# Patient Record
Sex: MALE | Race: BLACK OR AFRICAN AMERICAN | NOT HISPANIC OR LATINO | URBAN - METROPOLITAN AREA
[De-identification: names, ages, dates, MRNs, and addresses within clinical notes are randomized per-mention and may not be internally consistent; named-entity substitution may affect disease eponyms.]

---

## 2017-02-07 ENCOUNTER — EMERGENCY (EMERGENCY)
Facility: HOSPITAL | Age: 27
LOS: 1 days | Discharge: HOME | End: 2017-02-07
Attending: EMERGENCY MEDICINE
Payer: SELF-PAY

## 2017-02-07 VITALS
DIASTOLIC BLOOD PRESSURE: 62 MMHG | OXYGEN SATURATION: 96 % | RESPIRATION RATE: 22 BRPM | HEIGHT: 66 IN | SYSTOLIC BLOOD PRESSURE: 123 MMHG | HEART RATE: 100 BPM | TEMPERATURE: 98 F | WEIGHT: 132.06 LBS

## 2017-02-07 VITALS
SYSTOLIC BLOOD PRESSURE: 120 MMHG | HEART RATE: 89 BPM | DIASTOLIC BLOOD PRESSURE: 67 MMHG | RESPIRATION RATE: 18 BRPM | TEMPERATURE: 97 F | OXYGEN SATURATION: 99 %

## 2017-02-07 DIAGNOSIS — J45.909 UNSPECIFIED ASTHMA, UNCOMPLICATED: ICD-10-CM

## 2017-02-07 PROCEDURE — 99283 EMERGENCY DEPT VISIT LOW MDM: CPT | Mod: 25

## 2017-02-07 PROCEDURE — 94640 AIRWAY INHALATION TREATMENT: CPT

## 2017-02-07 PROCEDURE — 99284 EMERGENCY DEPT VISIT MOD MDM: CPT

## 2017-02-07 RX ORDER — IPRATROPIUM/ALBUTEROL SULFATE 18-103MCG
3 AEROSOL WITH ADAPTER (GRAM) INHALATION ONCE
Qty: 0 | Refills: 0 | Status: COMPLETED | OUTPATIENT
Start: 2017-02-07 | End: 2017-02-07

## 2017-02-07 RX ADMIN — Medication 3 MILLILITER(S): at 11:44

## 2017-02-07 RX ADMIN — Medication 40 MILLIGRAM(S): at 11:44

## 2017-02-07 NOTE — ED PROVIDER NOTE - OBJECTIVE STATEMENT
27 y/o M w/ PMHx of asthma p/w asthma exacerbation onset 2 days w/ SOB and wheezing. Pt has been using Albuterol to mild relief. Pt states he came today because he was visiting admitted grandmother in hospital. Pt was seen in ED 2 days ago for similar Sx and given Albuterol; pt was also seen in ED 2 weeks ago and given prednisone. Pt notes recent CXR last week w/ neg workup. Pt does not have PMD and states he missed appt with doctor recently but does not know why. Pt denies cough, fever, smoking, Hx of intubation, or any other complaint. NKDA. Pt does not have pulmonologist. 27 y/o M w/ PMHx of asthma p/w asthma exacerbation onset 2 days w/ SOB and wheezing. Pt has been using Albuterol to mild relief. Pt states he came today because he was visiting admitted grandmother in hospital. Pt is wearing multiple hospital bracelets from [facility] - pt was seen in ED 2 days ago for similar Sx and given Albuterol; pt was also seen in ED 2 weeks ago and given prednisone. Pt notes recent CXR last week w/ neg workup. Pt does not have PMD and states he missed appt with doctor recently but does not know why. Pt denies cough, fever, smoking, Hx of intubation, or any other complaint. NKDA. Pt does not have pulmonologist.

## 2017-02-07 NOTE — ED PROVIDER NOTE - MEDICAL DECISION MAKING DETAILS
Pt w/ asthma exacerbation. Speaking in full sentences. Prednisone and PCP f/u. Pt w/ asthma exacerbation. Speaking in full sentences. albuterol Prednisone and PCP f/u. patient declined primary care physician referral

## 2017-06-11 ENCOUNTER — EMERGENCY (EMERGENCY)
Facility: HOSPITAL | Age: 27
LOS: 1 days | Discharge: HOME | End: 2017-06-11
Attending: EMERGENCY MEDICINE | Admitting: EMERGENCY MEDICINE
Payer: SELF-PAY

## 2017-06-11 VITALS
HEART RATE: 68 BPM | RESPIRATION RATE: 20 BRPM | SYSTOLIC BLOOD PRESSURE: 103 MMHG | TEMPERATURE: 98 F | OXYGEN SATURATION: 98 % | DIASTOLIC BLOOD PRESSURE: 64 MMHG

## 2017-06-11 DIAGNOSIS — Z79.899 OTHER LONG TERM (CURRENT) DRUG THERAPY: ICD-10-CM

## 2017-06-11 DIAGNOSIS — J45.901 UNSPECIFIED ASTHMA WITH (ACUTE) EXACERBATION: ICD-10-CM

## 2017-06-11 PROCEDURE — 99053 MED SERV 10PM-8AM 24 HR FAC: CPT

## 2017-06-11 PROCEDURE — 99284 EMERGENCY DEPT VISIT MOD MDM: CPT | Mod: 25

## 2017-06-11 PROCEDURE — 99283 EMERGENCY DEPT VISIT LOW MDM: CPT | Mod: 25

## 2017-06-11 PROCEDURE — 94640 AIRWAY INHALATION TREATMENT: CPT

## 2017-06-11 RX ORDER — IPRATROPIUM/ALBUTEROL SULFATE 18-103MCG
3 AEROSOL WITH ADAPTER (GRAM) INHALATION ONCE
Qty: 0 | Refills: 0 | Status: COMPLETED | OUTPATIENT
Start: 2017-06-11 | End: 2017-06-11

## 2017-06-11 RX ORDER — ALBUTEROL 90 UG/1
1 AEROSOL, METERED ORAL ONCE
Qty: 0 | Refills: 0 | Status: COMPLETED | OUTPATIENT
Start: 2017-06-11 | End: 2017-06-11

## 2017-06-11 RX ADMIN — Medication 60 MILLIGRAM(S): at 06:13

## 2017-06-11 RX ADMIN — ALBUTEROL 1 PUFF(S): 90 AEROSOL, METERED ORAL at 06:42

## 2017-06-11 RX ADMIN — Medication 3 MILLILITER(S): at 06:04

## 2017-06-11 NOTE — ED PROVIDER NOTE - MEDICAL DECISION MAKING DETAILS
asthma exacerbation after running out of inhaler.  given neb/prednisone with relief.  home with similar.  new inhaler given

## 2017-06-11 NOTE — ED PROVIDER NOTE - OBJECTIVE STATEMENT
history of asthma, here with increased trouble breathing tonight.  States he ran of his inhaler.  In the ED 2 d ago for similar.  Given neb/steroid with improvement but didn't go home on prednisone.  Denies alcohol/drug use, fever, pain

## 2020-05-24 ENCOUNTER — EMERGENCY (EMERGENCY)
Facility: HOSPITAL | Age: 30
LOS: 1 days | Discharge: HOME | End: 2020-05-24
Attending: EMERGENCY MEDICINE
Payer: MEDICAID

## 2020-05-24 VITALS
TEMPERATURE: 98 F | RESPIRATION RATE: 18 BRPM | HEIGHT: 67 IN | HEART RATE: 68 BPM | WEIGHT: 130.07 LBS | OXYGEN SATURATION: 96 % | DIASTOLIC BLOOD PRESSURE: 68 MMHG | SYSTOLIC BLOOD PRESSURE: 104 MMHG

## 2020-05-24 PROCEDURE — 94640 AIRWAY INHALATION TREATMENT: CPT

## 2020-05-24 PROCEDURE — 99283 EMERGENCY DEPT VISIT LOW MDM: CPT

## 2020-05-24 RX ORDER — ALBUTEROL 90 UG/1
2 AEROSOL, METERED ORAL ONCE
Refills: 0 | Status: COMPLETED | OUTPATIENT
Start: 2020-05-24 | End: 2020-05-24

## 2020-05-24 RX ORDER — ALBUTEROL 90 UG/1
2 AEROSOL, METERED ORAL
Qty: 2 | Refills: 0
Start: 2020-05-24

## 2020-05-24 RX ADMIN — ALBUTEROL 2 PUFF(S): 90 AEROSOL, METERED ORAL at 15:41

## 2020-05-24 NOTE — ED ADULT NURSE NOTE - CHPI ED NUR SYMPTOMS NEG
no fever/no headache/no wheezing/no cough/no diaphoresis/no body aches/no chills/no edema/no hemoptysis/sats 96%/no chest pain

## 2020-05-24 NOTE — ED PROVIDER NOTE - CLINICAL SUMMARY MEDICAL DECISION MAKING FREE TEXT BOX
Patient with asthma exacerbation requesting albuterol prescription. Will give prescription and recommend PCP followup.

## 2020-05-24 NOTE — ED PROVIDER NOTE - NEUROLOGICAL, MLM
Alert and oriented, no focal deficits, no motor or sensory deficits.
Call bell/NPO/Explanation of exam/test

## 2020-05-24 NOTE — ED PROVIDER NOTE - OBJECTIVE STATEMENT
29 year old male with PMHx of asthma and no pertinent PSHx presents to the ED with complaints of an asthma exacerbation. Patient states that he ran out of his asthma pump, and is currently requesting a refill. Patient denies all other acute complaints. NKDA.

## 2020-05-24 NOTE — ED PROVIDER NOTE - PATIENT PORTAL LINK FT
You can access the FollowMyHealth Patient Portal offered by [facility] by registering at the following website: [email or web address]. By joining [employer]’s FollowMyHealth portal, you will also be able to view your health information using other applications (apps) compatible with our system.

## 2020-05-24 NOTE — ED ADULT TRIAGE NOTE - BSA (M2)
[Removes garments] : removes garments [Uses spoon/fork] : uses spoon/fork [Laughs with others] : laughs with others [Drinks from cup without spilling] : drinks from cup without spilling [Points to pictures] : points to pictures [Understands 2 step commands] : understands 2 step commands [Points to 1 body part] : points to 1 body part [Kicks ball forward] : kicks ball forward [Walks up steps] : walks up steps [Runs] : runs [FreeTextEntry3] : GM: 23 months\par PS: 19-3 months\par FM; 23 months\par language: 15 months  few words  Some  frusttraiton 1.68

## 2020-06-06 ENCOUNTER — EMERGENCY (EMERGENCY)
Facility: HOSPITAL | Age: 30
LOS: 1 days | Discharge: HOME | End: 2020-06-06
Attending: EMERGENCY MEDICINE
Payer: MEDICAID

## 2020-06-06 VITALS
OXYGEN SATURATION: 98 % | HEART RATE: 86 BPM | HEIGHT: 67 IN | WEIGHT: 130.07 LBS | RESPIRATION RATE: 20 BRPM | DIASTOLIC BLOOD PRESSURE: 80 MMHG | SYSTOLIC BLOOD PRESSURE: 129 MMHG | TEMPERATURE: 98 F

## 2020-06-06 PROCEDURE — 99283 EMERGENCY DEPT VISIT LOW MDM: CPT

## 2020-06-06 PROCEDURE — 94640 AIRWAY INHALATION TREATMENT: CPT

## 2020-06-06 RX ORDER — ALBUTEROL 90 UG/1
1 AEROSOL, METERED ORAL ONCE
Refills: 0 | Status: COMPLETED | OUTPATIENT
Start: 2020-06-06 | End: 2020-06-06

## 2020-06-06 RX ORDER — BUDESONIDE AND FORMOTEROL FUMARATE DIHYDRATE 160; 4.5 UG/1; UG/1
2 AEROSOL RESPIRATORY (INHALATION)
Refills: 0 | Status: DISCONTINUED | OUTPATIENT
Start: 2020-06-06 | End: 2020-06-10

## 2020-06-06 RX ADMIN — Medication 80 MILLIGRAM(S): at 15:57

## 2020-06-06 RX ADMIN — ALBUTEROL 1 PUFF(S): 90 AEROSOL, METERED ORAL at 15:57

## 2020-06-06 RX ADMIN — BUDESONIDE AND FORMOTEROL FUMARATE DIHYDRATE 2 PUFF(S): 160; 4.5 AEROSOL RESPIRATORY (INHALATION) at 15:57

## 2020-06-06 NOTE — ED PROVIDER NOTE - CLINICAL SUMMARY MEDICAL DECISION MAKING FREE TEXT BOX
29 year old male with history of asthma. Will give steroids and start on Budesonide. Called social work to give information to restart health insurance.

## 2020-06-06 NOTE — ED ADULT NURSE NOTE - NSIMPLEMENTINTERV_GEN_ALL_ED
"  Subjective:      Patient ID: Blake Dao is a 59 y.o. male.    Chief Complaint: Sinus Problem    Sinus Problem   This is a new problem. The current episode started 1 to 4 weeks ago. The problem has been gradually worsening since onset. Maximum temperature: subjective. Associated symptoms include chills, congestion, coughing, diaphoresis, headaches, shortness of breath, sinus pressure and a sore throat. Pertinent negatives include no ear pain, hoarse voice, neck pain, sneezing or swollen glands. Treatments tried: OTC sinus medications for 2 weeks. The treatment provided no relief.       Review of Systems   Constitutional: Positive for activity change, appetite change, chills, diaphoresis and fatigue. Negative for fever and unexpected weight change.   HENT: Positive for congestion, postnasal drip, rhinorrhea, sinus pressure and sore throat. Negative for dental problem, drooling, ear discharge, ear pain, facial swelling, hearing loss, hoarse voice, mouth sores, nosebleeds, sneezing and trouble swallowing.    Eyes: Negative for pain, discharge, redness, itching and visual disturbance.   Respiratory: Positive for cough and shortness of breath. Negative for chest tightness and wheezing.    Cardiovascular: Negative for chest pain, palpitations and leg swelling.   Gastrointestinal: Negative for abdominal pain, constipation, diarrhea, nausea and vomiting.   Endocrine: Negative.    Genitourinary: Negative.  Negative for difficulty urinating.   Musculoskeletal: Positive for myalgias. Negative for neck pain and neck stiffness.   Skin: Negative for rash.   Allergic/Immunologic: Negative for environmental allergies, food allergies and immunocompromised state.   Neurological: Positive for headaches. Negative for dizziness and weakness.     Objective:   BP (!) 120/98 (BP Location: Left arm, Patient Position: Sitting, BP Method: Medium (Manual))   Pulse 90   Temp 97.3 °F (36.3 °C) (Tympanic)   Resp 18   Ht 5' 11.85" " (1.825 m)   Wt 89.6 kg (197 lb 8.5 oz)   SpO2 100%   BMI 26.90 kg/m²     Physical Exam   Constitutional: He is oriented to person, place, and time. He appears well-developed and well-nourished. No distress.   HENT:   Head: Normocephalic and atraumatic.   Right Ear: Hearing, tympanic membrane, external ear and ear canal normal. No tenderness.   Left Ear: Hearing, tympanic membrane, external ear and ear canal normal. No tenderness.   Nose: Mucosal edema and rhinorrhea present. No sinus tenderness. Right sinus exhibits maxillary sinus tenderness and frontal sinus tenderness. Left sinus exhibits maxillary sinus tenderness and frontal sinus tenderness.   Mouth/Throat: Uvula is midline and mucous membranes are normal. No oral lesions. Normal dentition. No dental abscesses, uvula swelling or dental caries. Oropharyngeal exudate and posterior oropharyngeal erythema present. No posterior oropharyngeal edema or tonsillar abscesses. No tonsillar exudate.   Eyes: Pupils are equal, round, and reactive to light. Conjunctivae and EOM are normal. Right eye exhibits no discharge. Left eye exhibits no discharge.   Neck: Normal range of motion. Neck supple.   Cardiovascular: Normal rate, regular rhythm and normal heart sounds. Exam reveals no gallop and no friction rub.   No murmur heard.  Pulmonary/Chest: Effort normal. No respiratory distress. He has wheezes. He has rhonchi. He has no rales.   Lymphadenopathy:     He has no cervical adenopathy.   Neurological: He is alert and oriented to person, place, and time.   Skin: Skin is warm. No rash noted. He is not diaphoretic. No erythema. No pallor.   Psychiatric: He has a normal mood and affect. His behavior is normal. Judgment and thought content normal.   Nursing note and vitals reviewed.    X-Ray Chest PA And Lateral  Narrative: EXAMINATION:  XR CHEST PA AND LATERAL    CLINICAL HISTORY:  Shortness of breath    TECHNIQUE:  PA and lateral views of the chest were  performed.    COMPARISON:  05/18/2016    FINDINGS:  Cardiac silhouette and mediastinal contours are normal.  Lungs are clear.  Osseous structures are intact.  Impression: No acute cardiopulmonary process.    Electronically signed by: Joshua Caldwell MD  Date:    08/06/2019  Time:    11:47    Assessment:     1. Shortness of breath    2. Acute sinusitis, recurrence not specified, unspecified location    3. Recurrent sinus infections      Plan:   Shortness of breath  -     X-Ray Chest PA And Lateral; Future; Expected date: 08/06/2019    Acute sinusitis, recurrence not specified, unspecified location  -     X-Ray Chest PA And Lateral; Future; Expected date: 08/06/2019  -     amoxicillin-clavulanate 875-125mg (AUGMENTIN) 875-125 mg per tablet; Take 1 tablet by mouth 2 (two) times daily. for 10 days  Dispense: 20 tablet; Refill: 0    Recurrent sinus infections  -     Ambulatory Referral to ENT      Follow up if symptoms worsen or fail to improve.       Implemented All Universal Safety Interventions:  [place] to call system. Call bell, personal items and telephone within reach. Instruct patient to call for assistance. Room bathroom lighting operational. Non-slip footwear when patient is off stretcher. Physically safe environment: no spills, clutter or unnecessary equipment. Stretcher in lowest position, wheels locked, appropriate side rails in place.

## 2020-06-06 NOTE — ED PROVIDER NOTE - PATIENT PORTAL LINK FT
Addended by: NITESH TORREZ on: 7/5/2019 02:54 PM     Modules accepted: Orders     You can access the FollowMyHealth Patient Portal offered by [facility] by registering at the following website: [email or web address]. By joining [employer]’s FollowMyHealth portal, you will also be able to view your health information using other applications (apps) compatible with our system.

## 2020-06-06 NOTE — ED PROVIDER NOTE - OBJECTIVE STATEMENT
29 year old male with PMHx of asthma presenting with shortness of breath today. Patient was last seen here for the same on May 24th and has had multiple visits but never hospitalized and states that he does no have any insurance. Patient denies fever, chills, or any other complaints.

## 2020-07-01 ENCOUNTER — EMERGENCY (EMERGENCY)
Facility: HOSPITAL | Age: 30
LOS: 1 days | Discharge: HOME | End: 2020-07-01
Attending: EMERGENCY MEDICINE
Payer: MEDICAID

## 2020-07-01 VITALS
HEART RATE: 74 BPM | HEIGHT: 67 IN | WEIGHT: 130.07 LBS | SYSTOLIC BLOOD PRESSURE: 113 MMHG | TEMPERATURE: 99 F | RESPIRATION RATE: 16 BRPM | DIASTOLIC BLOOD PRESSURE: 64 MMHG | OXYGEN SATURATION: 98 %

## 2020-07-01 PROCEDURE — 99283 EMERGENCY DEPT VISIT LOW MDM: CPT | Mod: 25

## 2020-07-01 PROCEDURE — 99283 EMERGENCY DEPT VISIT LOW MDM: CPT

## 2020-07-01 PROCEDURE — 94640 AIRWAY INHALATION TREATMENT: CPT

## 2020-07-01 RX ORDER — ALBUTEROL 90 UG/1
2 AEROSOL, METERED ORAL ONCE
Refills: 0 | Status: COMPLETED | OUTPATIENT
Start: 2020-07-01 | End: 2020-07-01

## 2020-07-01 RX ADMIN — ALBUTEROL 2 PUFF(S): 90 AEROSOL, METERED ORAL at 19:02

## 2020-07-01 NOTE — ED ADULT TRIAGE NOTE - CHIEF COMPLAINT QUOTE
says he had an asthma attack today , no signs of distress noted in triage, (patient eating  snacks in triage)

## 2020-07-01 NOTE — ED PROVIDER NOTE - OBJECTIVE STATEMENT
28 y/o M pt with a PMHx of asthma, presents to the ED with complaints of "asthma attack". Patient reports he is having trouble breathing for the last 3 days. Patient appears comfortable at rest. Patient also noted to be in a hospital gown with discharge instructions from another hospital. When questioned, patient states he just dame from another hospital but refused to show discharge instructions or provide any information about other hospital visit. When provider asked if he is looking for an Albuterol inhaler, patient states, "yes". Denies chest pain, fever, chills, abdominal pain or any other symptoms. NKDA.

## 2020-07-01 NOTE — ED ADULT NURSE NOTE - NSIMPLEMENTINTERV_GEN_ALL_ED
Implemented All Universal Safety Interventions:  [place] to call system. Call bell, personal items and telephone within reach. Instruct patient to call for assistance. Room bathroom lighting operational. Non-slip footwear when patient is off stretcher. Physically safe environment: no spills, clutter or unnecessary equipment. Stretcher in lowest position, wheels locked, appropriate side rails in place.

## 2020-07-01 NOTE — ED PROVIDER NOTE - PROGRESS NOTE DETAILS
Patient left before receiving discharge paperwork.  Patient comfortable appearing throughout stay in ED

## 2020-07-01 NOTE — ED PROVIDER NOTE - CONSTITUTIONAL, MLM
normal... Well appearing, awake, alert, oriented to person, place, time/situation and in no apparent distress. Able to speak in full sentences.

## 2020-07-01 NOTE — ED PROVIDER NOTE - PATIENT PORTAL LINK FT
You can access the FollowMyHealth Patient Portal offered by [facility] by registering at the following website: [email or web address]. By joining [employer]’s FollowMyHealth portal, you will also be able to view your health information using other applications (apps) compatible with our system.

## 2020-07-01 NOTE — ED PROVIDER NOTE - ATTENDING CONTRIBUTION TO CARE
30 yo M with reported asthma attack. Patient unkempt and anxious. Recently discharged from [place]. Asking for albuterol inhaler. Pt is nontoxic appearing, not hypoxic, no respiratory distress. Patient left without discharge paperwork. Nontoxic and medically stable for discharge.     Dr. MATHEW Portillo:  I have independently evaluated the patient and have documented in the appropriate sections above.  I agree with the exam and plan as noted above.

## 2020-07-19 ENCOUNTER — EMERGENCY (EMERGENCY)
Facility: HOSPITAL | Age: 30
LOS: 1 days | Discharge: HOME | End: 2020-07-19
Attending: EMERGENCY MEDICINE
Payer: MEDICAID

## 2020-07-19 VITALS
HEART RATE: 64 BPM | WEIGHT: 136.03 LBS | HEIGHT: 69 IN | TEMPERATURE: 98 F | SYSTOLIC BLOOD PRESSURE: 129 MMHG | DIASTOLIC BLOOD PRESSURE: 83 MMHG | OXYGEN SATURATION: 99 % | RESPIRATION RATE: 16 BRPM

## 2020-07-19 PROCEDURE — 99283 EMERGENCY DEPT VISIT LOW MDM: CPT

## 2020-07-19 PROCEDURE — 99283 EMERGENCY DEPT VISIT LOW MDM: CPT | Mod: 25

## 2020-07-19 PROCEDURE — 94640 AIRWAY INHALATION TREATMENT: CPT

## 2020-07-19 RX ORDER — ALBUTEROL 90 UG/1
2 AEROSOL, METERED ORAL
Qty: 1 | Refills: 0
Start: 2020-07-19 | End: 2020-08-17

## 2020-07-19 RX ORDER — ALBUTEROL 90 UG/1
1 AEROSOL, METERED ORAL ONCE
Refills: 0 | Status: COMPLETED | OUTPATIENT
Start: 2020-07-19 | End: 2020-07-19

## 2020-07-19 RX ADMIN — ALBUTEROL 1 PUFF(S): 90 AEROSOL, METERED ORAL at 21:41

## 2020-07-19 NOTE — ED PROVIDER NOTE - OBJECTIVE STATEMENT
29 year old male with PMHx of asthma and no significant PSHx presents to the ED for medication refill. Patient states that he ran out of his albuterol inhaler. Patient denies any active symptoms at this time including chest pain, shortness of breath, cough, and fevers. NKDA.

## 2020-07-19 NOTE — ED PROVIDER NOTE - PRINCIPAL DIAGNOSIS
no discharge, no irritation, no pain, no redness, and no visual changes. Encounter for medication refill

## 2020-07-19 NOTE — ED PROVIDER NOTE - PATIENT PORTAL LINK FT
You can access the FollowMyHealth Patient Portal offered by [facility] by registering at the following website: [email or web address]. By joining [employer]’s FollowMyHealth portal, you will also be able to view your health information using other applications (apps) compatible with our system.

## 2020-07-19 NOTE — ED ADULT NURSE NOTE - NSIMPLEMENTINTERV_GEN_ALL_ED
Implemented All Universal Safety Interventions:  [place] to call system. Call bell, personal items and telephone within reach. Instruct patient to call for assistance. Room bathroom lighting operational. Non-slip footwear when patient is off stretcher. Physically safe environment: no spills, clutter or unnecessary equipment. Stretcher in lowest position, wheels locked, appropriate side rails in place.

## 2020-07-19 NOTE — ED ADULT NURSE NOTE - OBJECTIVE STATEMENT
pt is here for medication refill.  pt stated that ran out of medication, needs albuterol inhaler, denied sob or cough, denied N/V/D, no distress noted at this time.

## 2020-07-31 ENCOUNTER — EMERGENCY (EMERGENCY)
Facility: HOSPITAL | Age: 30
LOS: 1 days | Discharge: HOME | End: 2020-07-31
Attending: EMERGENCY MEDICINE
Payer: MEDICAID

## 2020-07-31 VITALS
OXYGEN SATURATION: 99 % | DIASTOLIC BLOOD PRESSURE: 72 MMHG | HEIGHT: 69 IN | RESPIRATION RATE: 16 BRPM | SYSTOLIC BLOOD PRESSURE: 108 MMHG | TEMPERATURE: 98 F | HEART RATE: 61 BPM | WEIGHT: 130.07 LBS

## 2020-07-31 PROCEDURE — 94640 AIRWAY INHALATION TREATMENT: CPT

## 2020-07-31 PROCEDURE — 99283 EMERGENCY DEPT VISIT LOW MDM: CPT | Mod: 25

## 2020-07-31 PROCEDURE — 99284 EMERGENCY DEPT VISIT MOD MDM: CPT

## 2020-07-31 RX ORDER — DEXAMETHASONE 0.5 MG/5ML
8 ELIXIR ORAL ONCE
Refills: 0 | Status: COMPLETED | OUTPATIENT
Start: 2020-07-31 | End: 2020-07-31

## 2020-07-31 RX ORDER — ALBUTEROL 90 UG/1
1 AEROSOL, METERED ORAL ONCE
Refills: 0 | Status: COMPLETED | OUTPATIENT
Start: 2020-07-31 | End: 2020-07-31

## 2020-07-31 RX ORDER — ALBUTEROL 90 UG/1
2.5 AEROSOL, METERED ORAL ONCE
Refills: 0 | Status: COMPLETED | OUTPATIENT
Start: 2020-07-31 | End: 2020-07-31

## 2020-07-31 RX ADMIN — Medication 8 MILLIGRAM(S): at 22:47

## 2020-07-31 RX ADMIN — ALBUTEROL 2.5 MILLIGRAM(S): 90 AEROSOL, METERED ORAL at 22:46

## 2020-07-31 RX ADMIN — ALBUTEROL 1 PUFF(S): 90 AEROSOL, METERED ORAL at 22:47

## 2020-07-31 NOTE — ED PROVIDER NOTE - OBJECTIVE STATEMENT
29 year old male PMH asthma coming in with asthma exacerbation. states happened today and he ran out of his inhaler so came to the ed. states feels somewhat improved at this time. denies all other complaints.

## 2020-07-31 NOTE — ED PROVIDER NOTE - PATIENT PORTAL LINK FT
You can access the FollowMyHealth Patient Portal offered by [facility] by registering at the following website: [email or web address]. By joining [employer]’s FollowMyHealth portal, you will also be able to view your health information using other applications (apps) compatible with our system.

## 2020-07-31 NOTE — ED ADULT NURSE NOTE - NSIMPLEMENTINTERV_GEN_ALL_ED
Implemented All Fall Risk Interventions:  [place] to call system. Call bell, personal items and telephone within reach. Instruct patient to call for assistance. Room bathroom lighting operational. Non-slip footwear when patient is off stretcher. Physically safe environment: no spills, clutter or unnecessary equipment. Stretcher in lowest position, wheels locked, appropriate side rails in place. Provide visual cue, wrist band, yellow gown, etc. Monitor gait and stability. Monitor for mental status changes and reorient to person, place, and time. Review medications for side effects contributing to fall risk. Reinforce activity limits and safety measures with patient and family.

## 2020-07-31 NOTE — ED PROVIDER NOTE - CLINICAL SUMMARY MEDICAL DECISION MAKING FREE TEXT BOX
29 year old male with asthma exacerbation. vitals WNL. PE as above.  no wheezing. asking for a treatment and an inhaler. will dc. f/u PMD. return precautions discussed.

## 2020-07-31 NOTE — ED PROVIDER NOTE - NS ED ATTENDING STATEMENT MOD
Attending Only O-L Flap Text: The defect edges were debeveled with a #15 scalpel blade.  Given the location of the defect, shape of the defect and the proximity to free margins an O-L flap was deemed most appropriate.  Using a sterile surgical marker, an appropriate advancement flap was drawn incorporating the defect and placing the expected incisions within the relaxed skin tension lines where possible.    The area thus outlined was incised deep to adipose tissue with a #15 scalpel blade.  The skin margins were undermined to an appropriate distance in all directions utilizing iris scissors.

## 2020-09-07 ENCOUNTER — EMERGENCY (EMERGENCY)
Facility: HOSPITAL | Age: 30
LOS: 1 days | Discharge: LEFT BEFORE DISCHARGE | End: 2020-09-07
Attending: EMERGENCY MEDICINE
Payer: MEDICAID

## 2020-09-07 VITALS
DIASTOLIC BLOOD PRESSURE: 73 MMHG | WEIGHT: 130.07 LBS | SYSTOLIC BLOOD PRESSURE: 113 MMHG | RESPIRATION RATE: 18 BRPM | OXYGEN SATURATION: 98 % | HEART RATE: 77 BPM | HEIGHT: 67 IN | TEMPERATURE: 98 F

## 2020-09-07 PROCEDURE — 94640 AIRWAY INHALATION TREATMENT: CPT

## 2020-09-07 PROCEDURE — 99283 EMERGENCY DEPT VISIT LOW MDM: CPT

## 2020-09-07 PROCEDURE — 99283 EMERGENCY DEPT VISIT LOW MDM: CPT | Mod: 25

## 2020-09-07 RX ORDER — IPRATROPIUM/ALBUTEROL SULFATE 18-103MCG
3 AEROSOL WITH ADAPTER (GRAM) INHALATION ONCE
Refills: 0 | Status: COMPLETED | OUTPATIENT
Start: 2020-09-07 | End: 2020-09-07

## 2020-09-07 RX ADMIN — Medication 3 MILLILITER(S): at 04:20

## 2020-09-07 RX ADMIN — Medication 50 MILLIGRAM(S): at 04:20

## 2020-09-07 NOTE — ED PROVIDER NOTE - CLINICAL SUMMARY MEDICAL DECISION MAKING FREE TEXT BOX
7am- Pt not in ED, eloped prior to reevaluation. 7am- Pt not in ED, eloped prior to reevaluation. Voice message left on provided telephone number to return.

## 2020-09-07 NOTE — ED PROVIDER NOTE - OBJECTIVE STATEMENT
Chief complaint of wheezing x 2 days. No fever, no productive coughing. No sick contacts, no outside [facility] travels.   No hx of intubations.

## 2020-09-21 ENCOUNTER — EMERGENCY (EMERGENCY)
Facility: HOSPITAL | Age: 30
LOS: 1 days | Discharge: HOME | End: 2020-09-21
Attending: EMERGENCY MEDICINE
Payer: MEDICAID

## 2020-09-21 VITALS
SYSTOLIC BLOOD PRESSURE: 115 MMHG | HEIGHT: 67 IN | HEART RATE: 77 BPM | OXYGEN SATURATION: 99 % | WEIGHT: 134.92 LBS | DIASTOLIC BLOOD PRESSURE: 78 MMHG | RESPIRATION RATE: 16 BRPM | TEMPERATURE: 98 F

## 2020-09-21 PROCEDURE — 94640 AIRWAY INHALATION TREATMENT: CPT

## 2020-09-21 PROCEDURE — 99283 EMERGENCY DEPT VISIT LOW MDM: CPT

## 2020-09-21 PROCEDURE — 99283 EMERGENCY DEPT VISIT LOW MDM: CPT | Mod: 25

## 2020-09-21 RX ORDER — ALBUTEROL 90 UG/1
2 AEROSOL, METERED ORAL ONCE
Refills: 0 | Status: COMPLETED | OUTPATIENT
Start: 2020-09-21 | End: 2020-09-21

## 2020-09-21 RX ORDER — ALBUTEROL 90 UG/1
2 AEROSOL, METERED ORAL
Qty: 1 | Refills: 0
Start: 2020-09-21 | End: 2020-10-20

## 2020-09-21 RX ORDER — ALBUTEROL 90 UG/1
2 AEROSOL, METERED ORAL
Qty: 1 | Refills: 0
Start: 2020-09-21 | End: 2020-11-05

## 2020-09-21 RX ADMIN — ALBUTEROL 2 PUFF(S): 90 AEROSOL, METERED ORAL at 14:56

## 2020-09-21 NOTE — ED PROVIDER NOTE - OBJECTIVE STATEMENT
29 y/o male with PMHx of asthma presents to the ED c/o wheezing x 2 weeks. Pt requesting an Rx of emergency albuterol pump. Pt denies fever, cough, or any other complaints. NKDA.

## 2020-09-21 NOTE — ED ADULT TRIAGE NOTE - RESPIRATORY RATE (BREATHS/MIN)
1455 Pt arrived at [facility] and in no distress for Daily Daptomycin . Assessment unchanged, no new complaints voiced. PICC dsg CDI, flushed per policy and positive for blood return. Visit Vitals    /50 (BP 1 Location: Left arm, BP Patient Position: At rest)    Pulse 61    Temp 97.9 °F (36.6 °C)    Resp 18    SpO2 97%       Medications received:  NS Flush  Daptomycin 500 mg IVP  Heparin Flush    1510 Tolerated treatment well, no adverse reaction noted. PICC flushed per policy and capped. D/Cd from [facility] and in no distress accompanied by Spouse.   Next appt 7/29/17 @ [facility]
16

## 2020-09-21 NOTE — ED PROVIDER NOTE - PATIENT PORTAL LINK FT
You can access the FollowMyHealth Patient Portal offered by [facility] by registering at the following website: [email or web address]. By joining [employer]’s FollowMyHealth portal, you will also be able to view your health information using other applications (apps) compatible with our system.

## 2020-09-21 NOTE — ED ADULT NURSE NOTE - OBJECTIVE STATEMENT
States he has wheezing due to asthma x2 weeks .Denies cough ,fever or shortness of breath .Request RX for Albuterol pump.No SOB noted in ED .

## 2020-09-21 NOTE — ED PROVIDER NOTE - CLINICAL SUMMARY MEDICAL DECISION MAKING FREE TEXT BOX
30 M with Hx asthma in ED with wheezing. Will provide an albuterol inhaler in ED and refill Rx. Will d/c home.

## 2020-10-01 ENCOUNTER — EMERGENCY (EMERGENCY)
Facility: HOSPITAL | Age: 30
LOS: 1 days | Discharge: HOME | End: 2020-10-01
Attending: EMERGENCY MEDICINE
Payer: MEDICAID

## 2020-10-01 VITALS
TEMPERATURE: 98 F | HEART RATE: 78 BPM | HEIGHT: 67 IN | RESPIRATION RATE: 20 BRPM | OXYGEN SATURATION: 97 % | SYSTOLIC BLOOD PRESSURE: 129 MMHG | DIASTOLIC BLOOD PRESSURE: 78 MMHG | WEIGHT: 121.25 LBS

## 2020-10-01 PROCEDURE — 99284 EMERGENCY DEPT VISIT MOD MDM: CPT

## 2020-10-01 PROCEDURE — 94640 AIRWAY INHALATION TREATMENT: CPT

## 2020-10-01 PROCEDURE — 99284 EMERGENCY DEPT VISIT MOD MDM: CPT | Mod: 25

## 2020-10-01 RX ORDER — ALBUTEROL 90 UG/1
1 AEROSOL, METERED ORAL ONCE
Refills: 0 | Status: COMPLETED | OUTPATIENT
Start: 2020-10-01 | End: 2020-10-01

## 2020-10-01 RX ORDER — IPRATROPIUM/ALBUTEROL SULFATE 18-103MCG
3 AEROSOL WITH ADAPTER (GRAM) INHALATION ONCE
Refills: 0 | Status: COMPLETED | OUTPATIENT
Start: 2020-10-01 | End: 2020-10-01

## 2020-10-01 RX ADMIN — Medication 50 MILLIGRAM(S): at 11:40

## 2020-10-01 RX ADMIN — ALBUTEROL 1 PUFF(S): 90 AEROSOL, METERED ORAL at 12:11

## 2020-10-01 RX ADMIN — Medication 3 MILLILITER(S): at 11:40

## 2020-10-01 NOTE — ED PROVIDER NOTE - PROGRESS NOTE DETAILS
Tolerated treatment well, will advise FU with PMD. Pt is well appearing walking with steady gait, stable for discharge and follow up without fail with medical doctor. I had a detailed discussion with the patient and/or guardian regarding the historical points, exam findings, and any diagnostic results supporting the discharge diagnosis. Pt educated on care and need for follow up. Strict return instructions and red flag signs and symptoms discussed with patient. Questions answered. Pt shows understanding of discharge information and agrees to follow.

## 2020-10-01 NOTE — ED PROVIDER NOTE - OBJECTIVE STATEMENT
29 yo male with PMHx of asthma presenting to ED with complaints of difficulty breathing and asthma attack today. Patient endorses taking his Albuterol pump without relief. Denies fever, sick contacts or travel. Denies CP or dizziness.

## 2020-10-01 NOTE — ED ADULT TRIAGE NOTE - CHIEF COMPLAINT QUOTE
P/s Asthma attack x today. No relief with Alb pump P/s Asthma attack x today. No relief with Alb pump. Peak flow 440

## 2020-10-01 NOTE — ED PROVIDER NOTE - PATIENT PORTAL LINK FT
You can access the FollowMyHealth Patient Portal offered by [facility] by registering at the following website: [email or web address]. By joining [employer]’s FollowMyHealth portal, you will also be able to view your health information using other applications (apps) compatible with our system.

## 2020-10-01 NOTE — ED PROVIDER NOTE - PHYSICAL EXAMINATION
Lungs CTA with equal bilateral chest rise, no increased work of breathing, speaking in full sentences.

## 2020-10-01 NOTE — ED PROVIDER NOTE - CLINICAL SUMMARY MEDICAL DECISION MAKING FREE TEXT BOX
Based on exam and history likely exacerbation of asthma due to cold weather and patient being undomiciled, will give Duo neb, Prednisone PO and recommend FU with PMD.

## 2020-10-07 ENCOUNTER — EMERGENCY (EMERGENCY)
Facility: HOSPITAL | Age: 30
LOS: 1 days | Discharge: HOME | End: 2020-10-07
Attending: STUDENT IN AN ORGANIZED HEALTH CARE EDUCATION/TRAINING PROGRAM
Payer: MEDICAID

## 2020-10-07 VITALS
RESPIRATION RATE: 18 BRPM | OXYGEN SATURATION: 99 % | DIASTOLIC BLOOD PRESSURE: 74 MMHG | HEART RATE: 78 BPM | WEIGHT: 123.46 LBS | SYSTOLIC BLOOD PRESSURE: 109 MMHG | HEIGHT: 67 IN

## 2020-10-07 VITALS — TEMPERATURE: 99 F

## 2020-10-07 PROCEDURE — 99283 EMERGENCY DEPT VISIT LOW MDM: CPT

## 2020-10-07 PROCEDURE — 99283 EMERGENCY DEPT VISIT LOW MDM: CPT | Mod: 25

## 2020-10-07 PROCEDURE — 94640 AIRWAY INHALATION TREATMENT: CPT

## 2020-10-07 RX ORDER — ALBUTEROL 90 UG/1
1 AEROSOL, METERED ORAL EVERY 4 HOURS
Refills: 0 | Status: DISCONTINUED | OUTPATIENT
Start: 2020-10-07 | End: 2020-10-10

## 2020-10-07 RX ORDER — DEXAMETHASONE 0.5 MG/5ML
10 ELIXIR ORAL ONCE
Refills: 0 | Status: COMPLETED | OUTPATIENT
Start: 2020-10-07 | End: 2020-10-07

## 2020-10-07 RX ORDER — ALBUTEROL 90 UG/1
1 AEROSOL, METERED ORAL EVERY 4 HOURS
Refills: 0 | Status: COMPLETED | OUTPATIENT
Start: 2020-10-07 | End: 2021-09-05

## 2020-10-07 RX ADMIN — ALBUTEROL 1 PUFF(S): 90 AEROSOL, METERED ORAL at 09:56

## 2020-10-07 RX ADMIN — Medication 10 MILLIGRAM(S): at 09:56

## 2020-10-07 NOTE — ED ADULT NURSE NOTE - NSIMPLEMENTINTERV_GEN_ALL_ED
Implemented All Universal Safety Interventions:  [place] to call system. Call bell, personal items and telephone within reach. Instruct patient to call for assistance. Room bathroom lighting operational. Non-slip footwear when patient is off stretcher. Physically safe environment: no spills, clutter or unnecessary equipment. Stretcher in lowest position, wheels locked, appropriate side rails in place.

## 2020-10-07 NOTE — ED PROVIDER NOTE - CLINICAL SUMMARY MEDICAL DECISION MAKING FREE TEXT BOX
Patient presenting with sob. pft 450. speaking full sentences. lung clear. no cough or cp. vital stable. will give steroid, albuterol, return precautions, patient clinically stable. no airway compromise on exam

## 2020-10-07 NOTE — ED PROVIDER NOTE - CARE PLAN
Principal Discharge DX:	Mild asthma, unspecified whether complicated, unspecified whether persistent

## 2020-10-07 NOTE — ED PROVIDER NOTE - PATIENT PORTAL LINK FT
You can access the FollowMyHealth Patient Portal offered by [facility] by registering at the following website: [email or web address]. By joining [employer]’s FollowMyHealth portal, you will also be able to view your health information using other applications (apps) compatible with our system.

## 2020-10-07 NOTE — ED PROVIDER NOTE - OBJECTIVE STATEMENT
30 y.o w/ pmh of asthma presenting with sob x 2 days. endorses feel like prior asthma. denies n, v, cp, fever, abd pain. endorses running out of albuterol.

## 2020-10-07 NOTE — ED ADULT NURSE NOTE - OBJECTIVE STATEMENT
pt presents for c/o sob & wheezing x 2days, has pmh of asthma & has had similar episodes in the past. Endorses running out of his current albuterol medication. Denies cp, palpitations, HA, dizziness, cough, fever or chills. NAD. Breathing unlabored on RA.

## 2020-10-30 ENCOUNTER — EMERGENCY (EMERGENCY)
Facility: HOSPITAL | Age: 30
LOS: 1 days | Discharge: HOME | End: 2020-10-30
Attending: EMERGENCY MEDICINE
Payer: MEDICAID

## 2020-10-30 VITALS
HEIGHT: 67 IN | OXYGEN SATURATION: 100 % | WEIGHT: 130.07 LBS | HEART RATE: 66 BPM | TEMPERATURE: 98 F | SYSTOLIC BLOOD PRESSURE: 109 MMHG | DIASTOLIC BLOOD PRESSURE: 68 MMHG | RESPIRATION RATE: 16 BRPM

## 2020-10-30 PROCEDURE — 94640 AIRWAY INHALATION TREATMENT: CPT

## 2020-10-30 PROCEDURE — 99284 EMERGENCY DEPT VISIT MOD MDM: CPT | Mod: 25

## 2020-10-30 PROCEDURE — 99284 EMERGENCY DEPT VISIT MOD MDM: CPT

## 2020-10-30 RX ORDER — IPRATROPIUM/ALBUTEROL SULFATE 18-103MCG
3 AEROSOL WITH ADAPTER (GRAM) INHALATION ONCE
Refills: 0 | Status: COMPLETED | OUTPATIENT
Start: 2020-10-30 | End: 2020-10-30

## 2020-10-30 RX ORDER — ALBUTEROL 90 UG/1
1 AEROSOL, METERED ORAL ONCE
Refills: 0 | Status: COMPLETED | OUTPATIENT
Start: 2020-10-30 | End: 2020-10-30

## 2020-10-30 RX ADMIN — Medication 3 MILLILITER(S): at 14:57

## 2020-10-30 RX ADMIN — ALBUTEROL 1 PUFF(S): 90 AEROSOL, METERED ORAL at 15:15

## 2020-10-30 NOTE — ED PROVIDER NOTE - OBJECTIVE STATEMENT
29 yo male with PMHx of asthma, presenting to ED with complaints of asthma exacerbation today. Patient has also run our of his MDI. Patient thinks cold air today made him SOB. Denies recent illness, fever, sick contacts or recent travel.

## 2020-10-30 NOTE — ED PROVIDER NOTE - ATTENDING CONTRIBUTION TO CARE
I completed an independent physical examination.   I have signed out the follow up of any pending tests (i.e. labs, radiological studies) to the PA/NP.  I have discussed the patient’s plan of care and disposition with the PA/NP    Patient with mild asthma flare up. Will give nebs and MDI and discharge when clinically improved.

## 2020-10-30 NOTE — ED ADULT NURSE REASSESSMENT NOTE - NS ED NURSE REASSESS COMMENT FT1
pt alert awake comfortable appearance no acute respiratory distress noted peak flow 500 prior to nebulizer treatment

## 2020-10-30 NOTE — ED ADULT NURSE NOTE - OBJECTIVE STATEMENT
pt from home c/o of SOB and cough today, reports ran out of albuterol inhaler, states "cold air made me short of breath", peak flow 500 prior to nebulizer treatment

## 2020-10-30 NOTE — ED PROVIDER NOTE - PATIENT PORTAL LINK FT
You can access the FollowMyHealth Patient Portal offered by [facility] by registering at the following website: [email or web address]. By joining [employer]’s FollowMyHealth portal, you will also be able to view your health information using other applications (apps) compatible with our system.

## 2020-10-30 NOTE — ED PROVIDER NOTE - PROGRESS NOTE DETAILS
Expressing relief with treatment. Pt is well appearing walking with steady gait, stable for discharge and follow up without fail with medical doctor. I had a detailed discussion with the patient and/or guardian regarding the historical points, exam findings, and any diagnostic results supporting the discharge diagnosis. Pt educated on care and need for follow up. Strict return instructions and red flag signs and symptoms discussed with patient. Questions answered. Pt shows understanding of discharge information and agrees to follow.

## 2020-10-30 NOTE — ED PROVIDER NOTE - CLINICAL SUMMARY MEDICAL DECISION MAKING FREE TEXT BOX
Based on exam and history likely asthma exacerbation, less likely URI/PNA. Will give duo neb treatment, and MDI to take home.

## 2020-11-18 ENCOUNTER — EMERGENCY (EMERGENCY)
Facility: HOSPITAL | Age: 30
LOS: 1 days | Discharge: HOME | End: 2020-11-18
Attending: EMERGENCY MEDICINE
Payer: MEDICAID

## 2020-11-18 VITALS
TEMPERATURE: 98 F | DIASTOLIC BLOOD PRESSURE: 94 MMHG | RESPIRATION RATE: 20 BRPM | HEIGHT: 67 IN | OXYGEN SATURATION: 99 % | SYSTOLIC BLOOD PRESSURE: 153 MMHG | HEART RATE: 94 BPM | WEIGHT: 130.07 LBS

## 2020-11-18 PROCEDURE — 99283 EMERGENCY DEPT VISIT LOW MDM: CPT

## 2020-11-18 PROCEDURE — 99282 EMERGENCY DEPT VISIT SF MDM: CPT

## 2020-11-18 NOTE — ED PROVIDER NOTE - PHYSICAL EXAMINATION
pt refuses to be examined, pt got up & left.  Noted tobacco on the stretcher & on floor.  Pt in no resp. distress. ROM intact/normal shape

## 2020-11-18 NOTE — ED ADULT NURSE NOTE - OBJECTIVE STATEMENT
pt is here for asthma.  pt stated that here for "asthma", finished sentences without difficulty, denied chest pain or sob, denied N/V/D, no distress noted at this time.

## 2020-11-18 NOTE — ED PROVIDER NOTE - PATIENT PORTAL LINK FT
You can access the FollowMyHealth Patient Portal offered by [facility] by registering at the following website: [email or web address]. By joining [employer]’s FollowMyHealth portal, you will also be able to view your health information using other applications (apps) compatible with our system.

## 2020-11-18 NOTE — ED PROVIDER NOTE - CLINICAL SUMMARY MEDICAL DECISION MAKING FREE TEXT BOX
pt claims he has asthma attack, but does not appear in resp distress.  Noted lots of tobacco on the stretcher & floor.  Pt refuses to be examined & walked out.

## 2020-11-18 NOTE — ED PROVIDER NOTE - OBJECTIVE STATEMENT
30 y.o. male c/o sob today, dry coughing, wheezing, no fever, n/v, pt ran out of his inhaler, pt's holding on 2 inhalers & asking for treatment.  Pt claims he lives with his mother in an apt.  No COVID exposure. 30 y.o. male c/o "I have asthma attack". sob today, dry coughing, wheezing, no fever, n/v, pt ran out of his inhaler, pt's holding on 2 inhalers & asking for treatment.  Pt claims he lives with his mother in an apt.  No COVID exposure.

## 2020-11-18 NOTE — ED ADULT NURSE NOTE - NSIMPLEMENTINTERV_GEN_ALL_ED
Implemented All Universal Safety Interventions:  [place] to call system. Call bell, personal items and telephone within reach. Instruct patient to call for assistance. Room bathroom lighting operational. Non-slip footwear when patient is off stretcher. Physically safe environment: no spills, clutter or unnecessary equipment. Stretcher in lowest position, wheels locked, appropriate side rails in place.

## 2020-12-16 ENCOUNTER — EMERGENCY (EMERGENCY)
Facility: HOSPITAL | Age: 30
LOS: 1 days | Discharge: HOME | End: 2020-12-16
Attending: EMERGENCY MEDICINE
Payer: MEDICAID

## 2020-12-16 VITALS
DIASTOLIC BLOOD PRESSURE: 68 MMHG | RESPIRATION RATE: 18 BRPM | TEMPERATURE: 98 F | SYSTOLIC BLOOD PRESSURE: 120 MMHG | HEART RATE: 60 BPM | OXYGEN SATURATION: 98 %

## 2020-12-16 VITALS
RESPIRATION RATE: 18 BRPM | HEART RATE: 67 BPM | TEMPERATURE: 98 F | WEIGHT: 130.07 LBS | SYSTOLIC BLOOD PRESSURE: 122 MMHG | OXYGEN SATURATION: 99 % | DIASTOLIC BLOOD PRESSURE: 70 MMHG | HEIGHT: 67 IN

## 2020-12-16 PROCEDURE — 94640 AIRWAY INHALATION TREATMENT: CPT

## 2020-12-16 PROCEDURE — 99283 EMERGENCY DEPT VISIT LOW MDM: CPT

## 2020-12-16 PROCEDURE — 99283 EMERGENCY DEPT VISIT LOW MDM: CPT | Mod: 25

## 2020-12-16 RX ORDER — ALBUTEROL 90 UG/1
2 AEROSOL, METERED ORAL ONCE
Refills: 0 | Status: COMPLETED | OUTPATIENT
Start: 2020-12-16 | End: 2020-12-16

## 2020-12-16 RX ADMIN — ALBUTEROL 2 PUFF(S): 90 AEROSOL, METERED ORAL at 17:18

## 2020-12-16 NOTE — ED ADULT TRIAGE NOTE - CHIEF COMPLAINT QUOTE
pt states " I had asthma attack for 3 days , took albuterol puff  days ago " Peak flow 400 . deneis any difficulty in breathing at present .

## 2020-12-16 NOTE — ED PROVIDER NOTE - PATIENT PORTAL LINK FT
You can access the FollowMyHealth Patient Portal offered by [facility] by registering at the following website: [email or web address]. By joining [employer]’s FollowMyHealth portal, you will also be able to view your health information using other applications (apps) compatible with our system.

## 2020-12-16 NOTE — ED PROVIDER NOTE - OBJECTIVE STATEMENT
30 y.o. male claims he lives alone, pt states he ran out of his inhaler, c/o sob for past 2-3 days, no wheezing, fever, coughing, nasal congestion, myalgia

## 2020-12-16 NOTE — ED ADULT NURSE NOTE - OBJECTIVE STATEMENT
Patient presents to ED with difficulty breathing since this morning associated with wheezing. Patient states he had asthma attack this morning but he ran out of medication. Patient denies pain/discomfort. sitting up on stretcher in no acute distress.

## 2021-01-19 ENCOUNTER — EMERGENCY (EMERGENCY)
Facility: HOSPITAL | Age: 31
LOS: 1 days | Discharge: HOME | End: 2021-01-19
Attending: EMERGENCY MEDICINE
Payer: MEDICAID

## 2021-01-19 VITALS
HEART RATE: 61 BPM | DIASTOLIC BLOOD PRESSURE: 69 MMHG | OXYGEN SATURATION: 99 % | SYSTOLIC BLOOD PRESSURE: 109 MMHG | TEMPERATURE: 97 F | RESPIRATION RATE: 17 BRPM | HEIGHT: 67 IN

## 2021-01-19 PROCEDURE — 99283 EMERGENCY DEPT VISIT LOW MDM: CPT

## 2021-01-19 PROCEDURE — 99283 EMERGENCY DEPT VISIT LOW MDM: CPT | Mod: 25

## 2021-01-19 PROCEDURE — 94640 AIRWAY INHALATION TREATMENT: CPT

## 2021-01-19 RX ORDER — ALBUTEROL 90 UG/1
2 AEROSOL, METERED ORAL ONCE
Refills: 0 | Status: COMPLETED | OUTPATIENT
Start: 2021-01-19 | End: 2021-01-19

## 2021-01-19 RX ADMIN — ALBUTEROL 2 PUFF(S): 90 AEROSOL, METERED ORAL at 13:30

## 2021-01-19 RX ADMIN — Medication 50 MILLIGRAM(S): at 13:29

## 2021-01-19 NOTE — ED PROVIDER NOTE - CLINICAL SUMMARY MEDICAL DECISION MAKING FREE TEXT BOX
30 y.o male with symptoms of usual asthma attack, well appearing, vital signs within normal limits , no respiratory distress, O2 sat 99% on RA, will start on prednisone and give albuterol pump in ER with instructions. Low suspicion of infectious processes , no indication of necessary chest x ray . Reviewed previous charts, patient presents to ED frequently for medication refills, but does not have a pulmonologist , will refer for further evaluation for uncontrolled asthma

## 2021-01-19 NOTE — ED PROVIDER NOTE - PRINCIPAL DIAGNOSIS
Mild intermittent asthma with exacerbation Exacerbation of asthma, unspecified asthma severity, unspecified whether persistent

## 2021-01-19 NOTE — ED PROVIDER NOTE - CARE PLAN
Principal Discharge DX:	Mild intermittent asthma with exacerbation   Principal Discharge DX:	Exacerbation of asthma, unspecified asthma severity, unspecified whether persistent

## 2021-01-19 NOTE — ED PROVIDER NOTE - NSFOLLOWUPCLINICS_GEN_ALL_ED_FT
[facility]  Pulmonary Medicine  [address] [place] [address]  Phone: [phone]  Fax: [phone]  Follow Up Time:

## 2021-01-19 NOTE — ED PROVIDER NOTE - OBJECTIVE STATEMENT
30 y.o male with a PMHx of asthma and no PSHx presents to the ED c.o intermittent wheezing for x2 days as well as a prescription for a albuterol inhaler. Patient endorses this feels like the usual presentation of a asthma attack. Patient denies any alleviating or aggravating factors . Patient denies any fever, chills, cough, shortness of breath , chest pain or any other acute complaints. NKDA

## 2021-01-19 NOTE — ED PROVIDER NOTE - PROGRESS NOTE DETAILS
Patient doesn't want to wait for me to re-assess him. He wants to be discharged. Explained importance of following up with a pulmonologist to address uncontrolled asthma. Pt is well appearing walking with steady gait, stable for discharge and follow up without fail with medical doctor. I had a detailed discussion with the patient and/or guardian regarding the historical points, exam findings, and any diagnostic results supporting the discharge diagnosis. Pt educated on care and need for follow up. Strict return instructions and red flag signs and symptoms discussed with patient. Questions answered. Pt shows understanding of discharge information and agrees to follow.

## 2021-01-19 NOTE — ED PROVIDER NOTE - NSFOLLOWUPINSTRUCTIONS_ED_ALL_ED_FT
Follow up with the pulmonologist within 1 week.  If you experience any new or worsening symptoms or if you are concerned you can always come back to the emergency for a re-evaluation.  If there were any prescriptions given to you during the visit today take them as prescribed. If you have any questions you can ask the pharmacist. Follow up with the pulmonologist within 1 week.    Albuterol pump use: 1-2 puffs every 4-6 hours as needed for asthma attack/shortness of breathe.     If you experience any new or worsening symptoms or if you are concerned you can always come back to the emergency for a re-evaluation.  If there were any prescriptions given to you during the visit today take them as prescribed. If you have any questions you can ask the pharmacist.

## 2021-01-19 NOTE — ED ADULT TRIAGE NOTE - HEIGHT IN INCHES
CC: Uncontrolled diabetes, hypertension, hyperlipidemia, CAD    HPI:   Evans presents today discuss the following medical issues    Uncontrolled type 2 diabetes mellitus with hyperglycemia ([facility])  Patient's blood glucose still uncontrolled, however it has improved.  Patient glucose has been between 200-300.  He is currently on Lantus 20 unit every night, and NovoLog 10 units 3 times a day.  Denies any polyuria and polydipsia.  Denies any nausea, vomiting, abdominal pain.      Essential hypertension  His blood pressure today is slightly high.  However patient denies any headache, chest pain, shortness of breath.  Patient has been checking his blood pressure at home has been within normal limits.  He is currently on lisinopril 20 g daily    Mixed hyperlipidemia  He has been tolerating the statin. Denies muscle pain LFTs has been normal, has been on atorvastatin 40 mg daily    S/P CABG x 2  Denies any chest pain, or shortness of breath.  Carvedilol 6.5 mg twice a day, aspirin 81 mg daily, and atorvastatin 40 mg daily.,  Patient has been following up with cardiology in a regular basis.          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 400 Each 3   • insulin glargine (LANTUS SOLOSTAR) 100 UNIT/ML Solution Pen-injector injection Inject 20 Units as instructed every  evening. 30 mL 3   • insulin lispro (HUMALOG KWIKPEN) 100 UNIT/ML Solution Pen-injector injection PEN Inject 10 Units as instructed 3 times a day before meals. 30 mL 3   • metFORMIN (GLUCOPHAGE) 500 MG Tab TAKE 2 TABS BY MOUTH 2 TIMES A DAY, WITH MEALS. (Patient not taking: Reported on 9/3/2020) 360 Tab 3   • Fluticasone Furoate-Vilanterol (BREO ELLIPTA) 100-25 MCG/INH AEROSOL POWDER, BREATH ACTIVATED Inhale 1 Puff by mouth every day. 1 Each 3   • BREO ELLIPTA 100-25 MCG/INH AEROSOL POWDER, BREATH ACTIVATED INHALE 1 PUFF BY MOUTH EVERY BEDTIME (MAX 30 DAYS PER INSURANCE) 1 Each 3   • lisinopril (PRINIVIL) 20 MG Tab Take 1 Tab by mouth every day. 90 Tab 3   • lisinopril (PRINIVIL) 10 MG Tab TAKE 1 TABLET BY MOUTH EVERY DAY 90 Tab 1   • glipiZIDE (GLUCOTROL) 10 MG Tab Take 1 Tab by mouth 2 times a day. (Patient not taking: Reported on 9/3/2020) 180 Tab 3   • carvedilol (COREG) 6.25 MG Tab TAKE 1 TAB BY MOUTH 2 TIMES A DAY, WITH MEALS.- INS MAX 30  Tab 3   • atorvastatin (LIPITOR) 40 MG Tab TAKE 1 TABLET BY MOUTH EVERYDAY AT BEDTIME 90 Tab 3   • SPIRIVA HANDIHALER 18 MCG Cap INHALE 1 CAP BY MOUTH EVERY DAY. 30 Cap 11   • Blood Glucose Monitoring Suppl Device Meter: Dispense Device of Insurance Preference( free style lite). Sig. Use as directed for blood sugar monitoring. #1. NR. 1 Device 0   • Blood Glucose Test Strips Test strips order: Test strips for free style lite meter. Sig: use 3 times daily  and prn ssx high or low sugar . 100 Each 3   • Lancets Lancets order: Lancets for free style lite meter. Sig: use 3 times daily  and prn ssx high or low sugar . 100 Each 3   • albuterol 108 (90 BASE) MCG/ACT Aero Soln inhalation aerosol Inhale 2 Puffs by mouth every four hours as needed. 8.5 g    • aspirin (ASA) 81 MG CHEW chewable tablet Take 1 Tab by mouth every day. 100 Tab 11     No current facility-administered medications for this visit.          Allergies as of 09/08/2020   • (No Known Allergies)        ROS:  "Denies any chest pain, Shortness of breath, Changes bowel or bladder, Lower extremity edema.    Physical Exam:  /76 (BP Location: Left arm, Patient Position: Sitting, BP Cuff Size: Adult)   Pulse 69   Temp 36.4 °C (97.5 °F) (Temporal)   Resp 16   Ht 1.753 m (5' 9\")   Wt 82.8 kg (182 lb 8.7 oz)   SpO2 94%   BMI 26.96 kg/m²   Gen.: Well-developed, well-nourished, no apparent distress,pleasant and cooperative with the examination  Skin:  Warm and dry with good turgor. No rashes or suspicious lesions in visible areas  HEENT:Sinuses nontender with palpation, TMs clear, nares patent with pink mucosa and clear rhinorrhea,no septal deviation ,polyps or lesions. lips without lesions, oropharynx clear.  Neck: Trachea midline,no masses or adenopathy. No JVD.  Cor: Regular rate and rhythm without murmur, gallop or rub.  Lungs: Respirations unlabored.Clear to auscultation with equal breath sounds bilaterally. No wheezes, rhonchi.  Extremities: No cyanosis, clubbing or edema.      Assessment and Plan.   72 y.o. male     1. Uncontrolled type 2 diabetes mellitus with hyperglycemia (HCC)  Still uncontrolled, monitor blood glucose.  Will increase Lantus to 25-30 units in PM, and increase NovoLog to 15 units 3 times a day.  Patient advised to check his blood sugars 3 times a day to me through my chart for evaluation  Return to clinic 2months or earlier if asymptomatic, uppercase blood glucose stable over 300.    - Insulin Pen Needle 32G X 8 MM Misc; Use 4 times a day.  Dispense: 400 Each; Refill: 3  - REFERRAL TO DIABETIC EDUCATION Diabetes Self Management Education / Training (DSME/T) and Medical Nutrition Therapy (MNT): Initial Group DSME/MNT as authorized by payor, Follow-Up DSME/MNT as authorized by payor; DSME/T Content: Monitoring Diabete...    3. Essential hypertension  Slightly high.  However patient has been checking his blood pressure at home has been within normal limits.  For now continue on lisinopril    4. " Mixed hyperlipidemia  He has been tolerating the statin. Denies muscle pain LFTs has been normal  Continuing atorvastatin 40 mg daily    5. S/P CABG x 2  Asymptomatic, stable.  Continue on carvedilol, atorvastatin, aspirin.  Continue follow-up with cardiology       7

## 2021-01-19 NOTE — ED PROVIDER NOTE - PATIENT PORTAL LINK FT
You can access the FollowMyHealth Patient Portal offered by [facility] by registering at the following website: [email or web address]. By joining [employer]’s FollowMyHealth portal, you will also be able to view your health information using other applications (apps) compatible with our system.

## 2021-01-19 NOTE — ED ADULT NURSE NOTE - OBJECTIVE STATEMENT
pt c/o of asthma exacerbation and running out of meds. Pt is able to speak in full sentences and not in any respiratory distress

## 2021-02-06 ENCOUNTER — EMERGENCY (EMERGENCY)
Facility: HOSPITAL | Age: 31
LOS: 1 days | Discharge: HOME | End: 2021-02-06
Attending: EMERGENCY MEDICINE
Payer: MEDICAID

## 2021-02-06 VITALS
RESPIRATION RATE: 22 BRPM | DIASTOLIC BLOOD PRESSURE: 67 MMHG | SYSTOLIC BLOOD PRESSURE: 109 MMHG | WEIGHT: 130.07 LBS | OXYGEN SATURATION: 100 % | HEIGHT: 67 IN | HEART RATE: 83 BPM | TEMPERATURE: 97 F

## 2021-02-06 PROCEDURE — 99284 EMERGENCY DEPT VISIT MOD MDM: CPT

## 2021-02-06 PROCEDURE — 99283 EMERGENCY DEPT VISIT LOW MDM: CPT | Mod: 25

## 2021-02-06 PROCEDURE — 94640 AIRWAY INHALATION TREATMENT: CPT

## 2021-02-06 RX ORDER — ALBUTEROL 90 UG/1
2 AEROSOL, METERED ORAL
Qty: 1 | Refills: 0
Start: 2021-02-06 | End: 2021-03-07

## 2021-02-06 RX ORDER — IPRATROPIUM/ALBUTEROL SULFATE 18-103MCG
3 AEROSOL WITH ADAPTER (GRAM) INHALATION ONCE
Refills: 0 | Status: COMPLETED | OUTPATIENT
Start: 2021-02-06 | End: 2021-02-06

## 2021-02-06 RX ADMIN — Medication 3 MILLILITER(S): at 19:01

## 2021-02-06 NOTE — ED PROVIDER NOTE - PATIENT PORTAL LINK FT
You can access the FollowMyHealth Patient Portal offered by [facility] by registering at the following website: [email or web address]. By joining [employer]’s FollowMyHealth portal, you will also be able to view your health information using other applications (apps) compatible with our system.

## 2021-02-06 NOTE — ED PROVIDER NOTE - CLINICAL SUMMARY MEDICAL DECISION MAKING FREE TEXT BOX
30 year old male with history of asthma presents to the ED with complaints of chest pain and shortness of breath consistent with past asthma attacks. Will discharge with albuterol.

## 2021-02-06 NOTE — ED PROVIDER NOTE - OBJECTIVE STATEMENT
30 year old male with PMHx of asthma and no signficant PSHx presents to the ED with complaints of chest pain and shortness of breath. Patient endorses that his current symptoms are consistent with his past asthma attacks. Patient is noted to be a poor historian, and states that he began to develop his symptoms on his way to the hospital. Upon evaluation in the ED, patient is sitting comfortably in his chair and has a oxygen saturation of 100% on room air. Patient otherwise denies any fever, diarrhea, chills, nausea, vomiting, and all other acute complaints. NKDA

## 2021-02-16 ENCOUNTER — EMERGENCY (EMERGENCY)
Facility: HOSPITAL | Age: 31
LOS: 1 days | Discharge: HOME | End: 2021-02-16
Attending: EMERGENCY MEDICINE
Payer: MEDICAID

## 2021-02-16 VITALS
SYSTOLIC BLOOD PRESSURE: 97 MMHG | TEMPERATURE: 98 F | HEART RATE: 74 BPM | OXYGEN SATURATION: 99 % | RESPIRATION RATE: 15 BRPM | DIASTOLIC BLOOD PRESSURE: 66 MMHG | HEIGHT: 67 IN | WEIGHT: 127.87 LBS

## 2021-02-16 PROCEDURE — 99284 EMERGENCY DEPT VISIT MOD MDM: CPT

## 2021-02-16 PROCEDURE — 99283 EMERGENCY DEPT VISIT LOW MDM: CPT | Mod: 25

## 2021-02-16 PROCEDURE — 94640 AIRWAY INHALATION TREATMENT: CPT

## 2021-02-16 RX ORDER — ALBUTEROL 90 UG/1
2 AEROSOL, METERED ORAL
Qty: 1 | Refills: 0
Start: 2021-02-16

## 2021-02-16 RX ORDER — DEXAMETHASONE 0.5 MG/5ML
8 ELIXIR ORAL ONCE
Refills: 0 | Status: COMPLETED | OUTPATIENT
Start: 2021-02-16 | End: 2021-02-16

## 2021-02-16 RX ORDER — ALBUTEROL 90 UG/1
2 AEROSOL, METERED ORAL ONCE
Refills: 0 | Status: COMPLETED | OUTPATIENT
Start: 2021-02-16 | End: 2021-02-16

## 2021-02-16 RX ADMIN — Medication 8 MILLIGRAM(S): at 11:12

## 2021-02-16 RX ADMIN — ALBUTEROL 2 PUFF(S): 90 AEROSOL, METERED ORAL at 11:13

## 2021-02-16 NOTE — ED PROVIDER NOTE - OBJECTIVE STATEMENT
Patient reports asthma exacerbation for 1 day, wheezing, chest tightness. Ran out of albuterol. Was seen here on 2/6 and given albuterol inhaler. Reports he has been using it twice a day. No fever, cp, sob, ap, n/v/d.

## 2021-02-16 NOTE — ED PROVIDER NOTE - CARE PLAN
Principal Discharge DX:	Asthma with acute exacerbation, unspecified asthma severity, unspecified whether persistent

## 2021-02-16 NOTE — ED PROVIDER NOTE - CLINICAL SUMMARY MEDICAL DECISION MAKING FREE TEXT BOX
Patient with asthma exacerbation, 3rd ED visit in 2 months for asthma. Satting well and no respiratory distress in ED. Will tx with albuterol and steroids, refer to PMD.

## 2021-02-16 NOTE — ED PROVIDER NOTE - NSFOLLOWUPINSTRUCTIONS_ED_ALL_ED_FT
Asthma    WHAT YOU NEED TO KNOW:    Asthma is a lung disease that makes breathing difficult. Chronic inflammation and reactions to triggers narrow the airways in the lungs. Asthma can become life-threatening if it is not managed.    Normal vs Asthmatic Bronchioles Adult         DISCHARGE INSTRUCTIONS:    Call your local emergency number (911 in the [place]) if:   •You have severe shortness of breath.      •The skin around your neck and ribs pulls in with each breath.      •Your peak flow numbers are in the red zone of your AAP.      Return to the emergency department if:   •You have shortness of breath, even after you take your short-term medicine as directed.      •Your lips or nails turn blue or gray.      Call your doctor or asthma specialist if:   •You run out of medicine before your next refill is due.      •Your symptoms get worse.      •You need to take more medicine than usual to control your symptoms.      •You have questions or concerns about your condition or care.      Medicines:   •Medicines may be used to decrease inflammation, open airways, and make it easier to breathe. Medicines may be inhaled, taken as a pill, or injected. Short-term medicines relieve your symptoms quickly. Long-term medicines are used to prevent future asthma attacks. Other medicines may be needed if your regular medicines are not able to prevent attacks. You may also need medicine to help control your allergies. Ask your healthcare provider for more information about any medicine you are given and how to take it safely.      •Take your medicine as directed. Contact your healthcare provider if you think your medicine is not helping or if you have side effects. Tell him of her if you are allergic to any medicine. Keep a list of the medicines, vitamins, and herbs you take. Include the amounts, and when and why you take them. Bring the list or the pill bottles to follow-up visits. Carry your medicine list with you in case of an emergency.      Manage your symptoms and prevent future attacks:   •Follow your Asthma Action Plan (AAP). This is a written plan that you and your healthcare provider create. It explains which medicine you need and when to change doses if necessary. It also explains how you can monitor symptoms and use a peak flow meter. The meter measures how well your lungs are working.      •Manage other health conditions, such as allergies, acid reflux, and sleep apnea.      •Identify and avoid triggers. These may include pets, dust mites, mold, and cockroaches.      •Do not smoke or be around others who smoke. Nicotine and other chemicals in cigarettes and cigars can cause lung damage. Ask your healthcare provider for information if you currently smoke and need help to quit. E-cigarettes or smokeless tobacco still contain nicotine. Talk to your healthcare provider before you use these products.      •Ask about the flu vaccine. The flu can make your asthma worse. You may need a yearly flu shot.      Follow up with your healthcare provider as directed: You will need to return to make sure your medicine is working and your symptoms are controlled. You may be referred to an asthma or allergy specialist. You may be asked to keep a record of your peak flow values and bring it with you to your appointments. Write down your questions so you remember to ask them during your visits.       © Copyright [employer] 2021           back to top                          © Copyright [employer] 2021

## 2021-02-16 NOTE — ED PROVIDER NOTE - PATIENT PORTAL LINK FT
You can access the FollowMyHealth Patient Portal offered by [facility] by registering at the following website: [email or web address]. By joining [email or web address]’s FollowMyHealth portal, you will also be able to view your health information using other applications (apps) compatible with our system.

## 2021-03-08 NOTE — ED PROVIDER NOTE - GASTROINTESTINAL NEGATIVE STATEMENT, MLM
alert and awake/follows commands
no abdominal pain, no bloating, no constipation, no diarrhea, no nausea and no vomiting.

## 2021-03-11 NOTE — ED PROVIDER NOTE - DR. NAME
Spoke with family/patient. Some drainage but seems to be improving  They are following up with the pediatrician tomorrow and surgery next week (they are to call and dw patient importance of f/u so they will now call tomorrow for appointment)  After dw family and pharmacist will change clinda to augmentin and will do Augmentin 875mg po q12hrs x7 days.   They were happy with plan/cares.    [phone]  They will dc clinda and begin Augmentin this evening.
Leana Barrios (Attending)

## 2021-04-08 ENCOUNTER — EMERGENCY (EMERGENCY)
Facility: HOSPITAL | Age: 31
LOS: 1 days | Discharge: HOME | End: 2021-04-08
Attending: EMERGENCY MEDICINE
Payer: MEDICAID

## 2021-04-08 VITALS
HEIGHT: 67 IN | SYSTOLIC BLOOD PRESSURE: 101 MMHG | RESPIRATION RATE: 18 BRPM | WEIGHT: 124.78 LBS | HEART RATE: 76 BPM | OXYGEN SATURATION: 98 % | DIASTOLIC BLOOD PRESSURE: 65 MMHG | TEMPERATURE: 99 F

## 2021-04-08 VITALS
OXYGEN SATURATION: 100 % | HEART RATE: 71 BPM | RESPIRATION RATE: 18 BRPM | TEMPERATURE: 99 F | SYSTOLIC BLOOD PRESSURE: 115 MMHG | DIASTOLIC BLOOD PRESSURE: 72 MMHG

## 2021-04-08 PROCEDURE — 94640 AIRWAY INHALATION TREATMENT: CPT

## 2021-04-08 PROCEDURE — 99285 EMERGENCY DEPT VISIT HI MDM: CPT | Mod: 25

## 2021-04-08 PROCEDURE — 99284 EMERGENCY DEPT VISIT MOD MDM: CPT

## 2021-04-08 RX ORDER — ALBUTEROL 90 UG/1
2 AEROSOL, METERED ORAL ONCE
Refills: 0 | Status: COMPLETED | OUTPATIENT
Start: 2021-04-08 | End: 2021-04-08

## 2021-04-08 RX ORDER — IPRATROPIUM/ALBUTEROL SULFATE 18-103MCG
3 AEROSOL WITH ADAPTER (GRAM) INHALATION
Refills: 0 | Status: COMPLETED | OUTPATIENT
Start: 2021-04-08 | End: 2021-04-08

## 2021-04-08 RX ADMIN — ALBUTEROL 2 PUFF(S): 90 AEROSOL, METERED ORAL at 23:12

## 2021-04-08 RX ADMIN — Medication 3 MILLILITER(S): at 21:52

## 2021-04-08 RX ADMIN — Medication 3 MILLILITER(S): at 21:15

## 2021-04-08 RX ADMIN — Medication 40 MILLIGRAM(S): at 21:15

## 2021-04-08 NOTE — ED PROVIDER NOTE - OBJECTIVE STATEMENT
30 y.o. male with h/o asthma, never been intubated c/o sob since yest., wheezing, no coughing, fever.  Pt ran out of his inhaler today.

## 2021-04-08 NOTE — ED PROVIDER NOTE - PATIENT PORTAL LINK FT
You can access the FollowMyHealth Patient Portal offered by  by registering at the following website: [email or web address]. By joining [employer]’s FollowMyHealth portal, you will also be able to view your health information using other applications (apps) compatible with our system.

## 2021-05-05 NOTE — ED PROVIDER NOTE - CARDIAC, MLM
Normal rate, regular rhythm.  Heart sounds S1, S2.  No murmurs, rubs or gallops. Tissue Cultured Epidermal Autograft Text: The defect edges were debeveled with a #15 scalpel blade.  Given the location of the defect, shape of the defect and the proximity to free margins a tissue cultured epidermal autograft was deemed most appropriate.  The graft was then trimmed to fit the size of the defect.  The graft was then placed in the primary defect and oriented appropriately.

## 2021-05-06 ENCOUNTER — EMERGENCY (EMERGENCY)
Facility: HOSPITAL | Age: 31
LOS: 1 days | Discharge: HOME | End: 2021-05-06
Attending: EMERGENCY MEDICINE
Payer: MEDICAID

## 2021-05-06 VITALS
HEIGHT: 67 IN | TEMPERATURE: 98 F | WEIGHT: 124.12 LBS | OXYGEN SATURATION: 99 % | HEART RATE: 70 BPM | RESPIRATION RATE: 20 BRPM | SYSTOLIC BLOOD PRESSURE: 124 MMHG | DIASTOLIC BLOOD PRESSURE: 73 MMHG

## 2021-05-06 VITALS
RESPIRATION RATE: 20 BRPM | OXYGEN SATURATION: 98 % | DIASTOLIC BLOOD PRESSURE: 73 MMHG | TEMPERATURE: 97 F | HEART RATE: 68 BPM | SYSTOLIC BLOOD PRESSURE: 121 MMHG

## 2021-05-06 PROCEDURE — 94640 AIRWAY INHALATION TREATMENT: CPT

## 2021-05-06 PROCEDURE — 99283 EMERGENCY DEPT VISIT LOW MDM: CPT | Mod: 25

## 2021-05-06 PROCEDURE — 99284 EMERGENCY DEPT VISIT MOD MDM: CPT

## 2021-05-06 RX ORDER — ALBUTEROL 90 UG/1
2 AEROSOL, METERED ORAL ONCE
Refills: 0 | Status: COMPLETED | OUTPATIENT
Start: 2021-05-06 | End: 2021-05-06

## 2021-05-06 RX ORDER — ALBUTEROL 90 UG/1
2 AEROSOL, METERED ORAL ONCE
Refills: 0 | Status: COMPLETED | OUTPATIENT
Start: 2021-05-06 | End: 2022-04-04

## 2021-05-06 RX ADMIN — ALBUTEROL 2 PUFF(S): 90 AEROSOL, METERED ORAL at 09:34

## 2021-05-06 NOTE — ED ADULT NURSE NOTE - OBJECTIVE STATEMENT
As per pt, c/o SOB x3days worsening today. Pt denies h/a, f/c, n/v/d, CP, or cough. Pt admits to having asthma and no asthma pump.

## 2021-05-06 NOTE — ED PROVIDER NOTE - OBJECTIVE STATEMENT
31yo M w hx of asthma, non-smoker 31yo M w hx of asthma, non-smoker, in the ED for SOB today, states typical asthma attack. Denies cough/CP/back pains/fevers/chills/sore throat. Mild symptoms. Ran out of his albuterol pump

## 2021-05-06 NOTE — ED ADULT NURSE NOTE - NSIMPLEMENTINTERV_GEN_ALL_ED
Implemented All Universal Safety Interventions:  [place] to call system. Call bell, personal items and telephone within reach. Instruct patient to call for assistance. Room bathroom lighting operational. Non-slip footwear when patient is off stretcher. Physically safe environment: no spills, clutter or unnecessary equipment. Stretcher in lowest position, wheels locked, appropriate side rails in place.

## 2021-05-06 NOTE — ED PROVIDER NOTE - CARE PLAN
Principal Discharge DX:	Asthma, unspecified asthma severity, unspecified whether complicated, unspecified whether persistent

## 2021-05-06 NOTE — ED PROVIDER NOTE - PATIENT PORTAL LINK FT
You can access the FollowMyHealth Patient Portal offered by [facility] by registering at the following website: [email or web address]. By joining [employer]’s FollowMyHealth portal, you will also be able to view your health information using other applications (apps) compatible with our system.

## 2021-05-06 NOTE — ED PROVIDER NOTE - NSFOLLOWUPINSTRUCTIONS_ED_ALL_ED_FT
Take Albuterol as prescribed/needed/  Take Tylenol as needed for pains/fevers.  Follow up with your doctor or in the Clinic as discussed within 1 week.  Return to the ER for any concerns.

## 2021-05-06 NOTE — ED PROVIDER NOTE - PHYSICAL EXAMINATION
Well appearing, in NAD  Moist mucosae  Pink conjunctivae  Lungs w min expiratory wheezing, good air movement   Abdomen soft/NT  AAOx3, no gross neuro deficits

## 2021-05-06 NOTE — ED PROVIDER NOTE - CLINICAL SUMMARY MEDICAL DECISION MAKING FREE TEXT BOX
31yo M w mild asthma exacerbation, ran out of his Albutaerol pump. VS, exam wnl except min wheezing. Will give puffs and pump in the ED. Will DC w PCP/Clinic f/u

## 2021-05-22 ENCOUNTER — EMERGENCY (EMERGENCY)
Facility: HOSPITAL | Age: 31
LOS: 1 days | Discharge: LEFT BEFORE DISCHARGE | End: 2021-05-22
Attending: EMERGENCY MEDICINE
Payer: MEDICAID

## 2021-05-22 VITALS
HEIGHT: 67 IN | RESPIRATION RATE: 18 BRPM | HEART RATE: 70 BPM | TEMPERATURE: 98 F | OXYGEN SATURATION: 98 % | SYSTOLIC BLOOD PRESSURE: 104 MMHG | DIASTOLIC BLOOD PRESSURE: 62 MMHG

## 2021-05-22 VITALS
OXYGEN SATURATION: 98 % | DIASTOLIC BLOOD PRESSURE: 55 MMHG | RESPIRATION RATE: 18 BRPM | SYSTOLIC BLOOD PRESSURE: 102 MMHG | HEART RATE: 63 BPM | TEMPERATURE: 100 F

## 2021-05-22 PROCEDURE — 99282 EMERGENCY DEPT VISIT SF MDM: CPT

## 2021-05-22 PROCEDURE — 99284 EMERGENCY DEPT VISIT MOD MDM: CPT

## 2021-05-22 RX ORDER — ALBUTEROL 90 UG/1
2.5 AEROSOL, METERED ORAL
Refills: 0 | Status: DISCONTINUED | OUTPATIENT
Start: 2021-05-22 | End: 2021-05-26

## 2021-05-22 RX ORDER — ALBUTEROL 90 UG/1
2 AEROSOL, METERED ORAL
Qty: 1 | Refills: 0
Start: 2021-05-22 | End: 2021-06-20

## 2021-05-22 RX ORDER — ALBUTEROL 90 UG/1
2 AEROSOL, METERED ORAL
Qty: 1 | Refills: 0
Start: 2021-05-22 | End: 2021-07-03

## 2021-05-22 NOTE — ED PROVIDER NOTE - NSFOLLOWUPINSTRUCTIONS_ED_ALL_ED_FT
Asthma    WHAT YOU NEED TO KNOW:    Asthma is a lung disease that makes breathing difficult. Chronic inflammation and reactions to triggers narrow the airways in the lungs. Asthma can become life-threatening if it is not managed.    Normal vs Asthmatic Bronchioles Adult         DISCHARGE INSTRUCTIONS:    Call your local emergency number (911 in the [place]) if:   •You have severe shortness of breath.      •The skin around your neck and ribs pulls in with each breath.      •Your peak flow numbers are in the red zone of your AAP.      Return to the emergency department if:   •You have shortness of breath, even after you take your short-term medicine as directed.      •Your lips or nails turn blue or gray.      Call your doctor or asthma specialist if:   •You run out of medicine before your next refill is due.      •Your symptoms get worse.      •You need to take more medicine than usual to control your symptoms.      •You have questions or concerns about your condition or care.      Medicines:   •Medicines may be used to decrease inflammation, open airways, and make it easier to breathe. Medicines may be inhaled, taken as a pill, or injected. Short-term medicines relieve your symptoms quickly. Long-term medicines are used to prevent future asthma attacks. Other medicines may be needed if your regular medicines are not able to prevent attacks. You may also need medicine to help control your allergies. Ask your healthcare provider for more information about any medicine you are given and how to take it safely.      •Take your medicine as directed. Contact your healthcare provider if you think your medicine is not helping or if you have side effects. Tell him of her if you are allergic to any medicine. Keep a list of the medicines, vitamins, and herbs you take. Include the amounts, and when and why you take them. Bring the list or the pill bottles to follow-up visits. Carry your medicine list with you in case of an emergency.      Manage your symptoms and prevent future attacks:   •Follow your Asthma Action Plan (AAP). This is a written plan that you and your healthcare provider create. It explains which medicine you need and when to change doses if necessary. It also explains how you can monitor symptoms and use a peak flow meter. The meter measures how well your lungs are working.      •Manage other health conditions, such as allergies, acid reflux, and sleep apnea.      •Identify and avoid triggers. These may include pets, dust mites, mold, and cockroaches.      •Do not smoke or be around others who smoke. Nicotine and other chemicals in cigarettes and cigars can cause lung damage. Ask your healthcare provider for information if you currently smoke and need help to quit. E-cigarettes or smokeless tobacco still contain nicotine. Talk to your healthcare provider before you use these products.      •Ask about the flu vaccine. The flu can make your asthma worse. You may need a yearly flu shot.      Follow up with your healthcare provider as directed: You will need to return to make sure your medicine is working and your symptoms are controlled. You may be referred to an asthma or allergy specialist. You may be asked to keep a record of your peak flow values and bring it with you to your appointments. Write down your questions so you remember to ask them during your visits.       © Copyright [employer] 2021           back to top                          © Copyright [employer] 2021

## 2021-05-22 NOTE — ED PROVIDER NOTE - PROGRESS NOTE DETAILS
I saw patient just after he was moved from Intake to main ED. Patient not found in ED. Did not respond to overhead page. Patient eloped without completing treatment. Tried to call patient. Left voicemail Went to reassess patient. Patient not in chair. I spoke to nurse, patient did not receive medication.

## 2021-05-22 NOTE — ED PROVIDER NOTE - OBJECTIVE STATEMENT
Patient reports 2 days of shortness of breath, wheezing. Feels like his typical asthma. No fever, cp, ap, cough, n/v/d. Ran out of albuterol.

## 2021-06-04 ENCOUNTER — EMERGENCY (EMERGENCY)
Facility: HOSPITAL | Age: 31
LOS: 1 days | Discharge: HOME | End: 2021-06-04
Attending: EMERGENCY MEDICINE
Payer: MEDICAID

## 2021-06-04 VITALS
DIASTOLIC BLOOD PRESSURE: 72 MMHG | SYSTOLIC BLOOD PRESSURE: 109 MMHG | OXYGEN SATURATION: 97 % | WEIGHT: 145.06 LBS | HEIGHT: 67 IN | HEART RATE: 69 BPM | TEMPERATURE: 98 F | RESPIRATION RATE: 20 BRPM

## 2021-06-04 PROCEDURE — 99283 EMERGENCY DEPT VISIT LOW MDM: CPT | Mod: 25

## 2021-06-04 PROCEDURE — 94640 AIRWAY INHALATION TREATMENT: CPT

## 2021-06-04 PROCEDURE — 99284 EMERGENCY DEPT VISIT MOD MDM: CPT

## 2021-06-04 RX ORDER — MONTELUKAST 4 MG/1
1 TABLET, CHEWABLE ORAL
Qty: 30 | Refills: 0
Start: 2021-06-04 | End: 2021-07-03

## 2021-06-04 RX ORDER — FLUTICASONE PROPIONATE AND SALMETEROL 50; 250 UG/1; UG/1
1 POWDER ORAL; RESPIRATORY (INHALATION)
Qty: 1 | Refills: 0
Start: 2021-06-04 | End: 2021-09-17

## 2021-06-04 RX ORDER — IPRATROPIUM/ALBUTEROL SULFATE 18-103MCG
3 AEROSOL WITH ADAPTER (GRAM) INHALATION ONCE
Refills: 0 | Status: COMPLETED | OUTPATIENT
Start: 2021-06-04 | End: 2021-06-04

## 2021-06-04 RX ORDER — ALBUTEROL 90 UG/1
1 AEROSOL, METERED ORAL ONCE
Refills: 0 | Status: COMPLETED | OUTPATIENT
Start: 2021-06-04 | End: 2021-06-04

## 2021-06-04 RX ORDER — FLUTICASONE PROPIONATE AND SALMETEROL 50; 250 UG/1; UG/1
1 POWDER ORAL; RESPIRATORY (INHALATION)
Qty: 1 | Refills: 0
Start: 2021-06-04 | End: 2021-07-03

## 2021-06-04 RX ADMIN — Medication 3 MILLILITER(S): at 11:54

## 2021-06-04 RX ADMIN — Medication 50 MILLIGRAM(S): at 11:54

## 2021-06-04 RX ADMIN — ALBUTEROL 1 PUFF(S): 90 AEROSOL, METERED ORAL at 12:13

## 2021-06-04 NOTE — ED PROVIDER NOTE - CLINICAL SUMMARY MEDICAL DECISION MAKING FREE TEXT BOX
Patient with asthma exacerbation. Will start patient with duo nebs, prednisone, started patient on Advair, Montelukast and f/u with a Pulmonologist.

## 2021-06-04 NOTE — ED ADULT NURSE NOTE - NSIMPLEMENTINTERV_GEN_ALL_ED
Implemented All Universal Safety Interventions:  [place] to call system. Call bell, personal items and telephone within reach. Instruct patient to call for assistance. Room bathroom lighting operational. Non-slip footwear when patient is off stretcher. Physically safe environment: no spills, clutter or unnecessary equipment. Stretcher in lowest position, wheels locked, appropriate side rails in place.

## 2021-06-04 NOTE — ED PROVIDER NOTE - OBJECTIVE STATEMENT
29 y/o M patient with a significant PMHx of asthma exacerbation (never hospitalized or never intubated) presents to the ED with c/o asthma exacerbation. Patient reports every 2 weeks, patient has an asthma exacerbation. Patient reports the asthma exacerbation occurs every change in season.

## 2021-06-04 NOTE — ED PROVIDER NOTE - PATIENT PORTAL LINK FT
You can access the FollowMyHealth Patient Portal offered by [facility] by registering at the following website: [email or web address]. By joining [employer]’s FollowMyHealth portal, you will also be able to view your health information using other applications (apps) compatible with our system.

## 2021-07-04 ENCOUNTER — EMERGENCY (EMERGENCY)
Facility: HOSPITAL | Age: 31
LOS: 1 days | Discharge: HOME | End: 2021-07-04
Attending: STUDENT IN AN ORGANIZED HEALTH CARE EDUCATION/TRAINING PROGRAM
Payer: MEDICAID

## 2021-07-04 VITALS
TEMPERATURE: 98 F | HEART RATE: 79 BPM | HEIGHT: 67 IN | OXYGEN SATURATION: 98 % | RESPIRATION RATE: 18 BRPM | DIASTOLIC BLOOD PRESSURE: 71 MMHG | WEIGHT: 122.58 LBS | SYSTOLIC BLOOD PRESSURE: 109 MMHG

## 2021-07-04 PROCEDURE — 99284 EMERGENCY DEPT VISIT MOD MDM: CPT

## 2021-07-04 PROCEDURE — 99283 EMERGENCY DEPT VISIT LOW MDM: CPT

## 2021-07-04 RX ORDER — ALBUTEROL 90 UG/1
2 AEROSOL, METERED ORAL
Qty: 240 | Refills: 0
Start: 2021-07-04 | End: 2021-08-02

## 2021-07-04 RX ORDER — ALBUTEROL 90 UG/1
2 AEROSOL, METERED ORAL
Qty: 240 | Refills: 0
Start: 2021-07-04 | End: 2021-09-17

## 2021-07-04 RX ORDER — DEXAMETHASONE 0.5 MG/5ML
10 ELIXIR ORAL ONCE
Refills: 0 | Status: COMPLETED | OUTPATIENT
Start: 2021-07-04 | End: 2021-07-04

## 2021-07-04 RX ADMIN — Medication 10 MILLIGRAM(S): at 02:46

## 2021-07-04 NOTE — ED PROVIDER NOTE - PATIENT PORTAL LINK FT
You can access the FollowMyHealth Patient Portal offered by [facility] by registering at the following website: [email or web address]. By joining [employer]’s FollowMyHealth portal, you will also be able to view your health information using other applications (apps) compatible with our system.

## 2021-07-04 NOTE — ED PROVIDER NOTE - OBJECTIVE STATEMENT
31yo M pmhx mild intermittent asthma p/w wheezing today. patient uncooperative with h&p at this time, eating meal tray. States that he ran out of home albuterol pump and needs rx. States he feels well right now and wants to rest. Patient denies f/c, cp, sob, cough/hemoptysis or other complaints.

## 2021-07-04 NOTE — ED PROVIDER NOTE - NS ED ROS FT
(+) wheezing  (-) fevers, chills  (-) dizziness, lightheadedness, vision changes  (-) neck pain, stiffness  (-) cp, palpitations  (-) abd pain, n/v/d/c   (-) urinary sxs, back pain  (-) weakness, paresthesias

## 2021-07-04 NOTE — ED ADULT NURSE NOTE - OBJECTIVE STATEMENT
pt presents to ED with complaints of difficulty breathing. Hx of asthma as per pt. Denies chest pain, nausea, vomiting, fever and chills.

## 2021-07-04 NOTE — ED PROVIDER NOTE - PHYSICAL EXAMINATION
Sohrawardy:   VS reviewed  NAD, not ill-appearing  aaox3  nc/at, neck rom normal, supple  rrr, s1s2, no jvd, no pitting edema  normal resp effort  lungs ctab, no w/r/r  eating meal tray

## 2021-07-04 NOTE — ED PROVIDER NOTE - CLINICAL SUMMARY MEDICAL DECISION MAKING FREE TEXT BOX
pt p/w albuterol pump request, eating meal tray  no wheezing in ED, comfortable  uncooperative with history and exam

## 2021-07-14 ENCOUNTER — EMERGENCY (EMERGENCY)
Facility: HOSPITAL | Age: 31
LOS: 1 days | Discharge: HOME | End: 2021-07-14
Attending: STUDENT IN AN ORGANIZED HEALTH CARE EDUCATION/TRAINING PROGRAM
Payer: MEDICAID

## 2021-07-14 VITALS
HEART RATE: 81 BPM | HEIGHT: 67 IN | WEIGHT: 119.93 LBS | SYSTOLIC BLOOD PRESSURE: 104 MMHG | TEMPERATURE: 98 F | DIASTOLIC BLOOD PRESSURE: 63 MMHG | RESPIRATION RATE: 16 BRPM | OXYGEN SATURATION: 99 %

## 2021-07-14 PROCEDURE — 99283 EMERGENCY DEPT VISIT LOW MDM: CPT | Mod: 25

## 2021-07-14 PROCEDURE — 94640 AIRWAY INHALATION TREATMENT: CPT

## 2021-07-14 PROCEDURE — 99284 EMERGENCY DEPT VISIT MOD MDM: CPT

## 2021-07-14 RX ORDER — IPRATROPIUM/ALBUTEROL SULFATE 18-103MCG
3 AEROSOL WITH ADAPTER (GRAM) INHALATION ONCE
Refills: 0 | Status: COMPLETED | OUTPATIENT
Start: 2021-07-14 | End: 2021-07-14

## 2021-07-14 RX ORDER — ALBUTEROL 90 UG/1
2 AEROSOL, METERED ORAL
Qty: 1 | Refills: 0
Start: 2021-07-14

## 2021-07-14 RX ORDER — ALBUTEROL 90 UG/1
1 AEROSOL, METERED ORAL ONCE
Refills: 0 | Status: COMPLETED | OUTPATIENT
Start: 2021-07-14 | End: 2021-07-14

## 2021-07-14 RX ADMIN — ALBUTEROL 1 PUFF(S): 90 AEROSOL, METERED ORAL at 22:05

## 2021-07-14 RX ADMIN — Medication 3 MILLILITER(S): at 22:05

## 2021-07-14 NOTE — ED PROVIDER NOTE - PHYSICAL EXAMINATION
General: well appearing male, no acute distress   HEENT: normocephalic, atraumatic   Respiratory: normal work of breathing, mild wheezing   Cardiac: regular rate and rhythm   Abdomen: soft, non-tender, no guarding or rebound   MSK: no swelling or tenderness of lower extremities, moving all extremities spontaneously   Skin: warm, dry   Neuro: A&Ox3  Psych: appropriate affect

## 2021-07-14 NOTE — ED PROVIDER NOTE - PATIENT PORTAL LINK FT
You can access the FollowMyHealth Patient Portal offered by [facility] by registering at the following website: [email or web address]. By joining [email or web address]’s FollowMyHealth portal, you will also be able to view your health information using other applications (apps) compatible with our system.

## 2021-07-14 NOTE — ED PROVIDER NOTE - CLINICAL SUMMARY MEDICAL DECISION MAKING FREE TEXT BOX
30M presenting with sob. mild wheezing on exam. likely asthma exacerbation. requesting albuterol pump. will discharge.

## 2021-07-14 NOTE — ED PROVIDER NOTE - NSFOLLOWUPINSTRUCTIONS_ED_ALL_ED_FT
You were seen in the emergency department for shortness of breath likely caused by an asthma exacerbation.     Please follow-up with your primary care doctor in the next 24-48 hours.     If you have any worsening symptoms, severe chest pain or shortness of breath, please return to the emergency department.

## 2021-07-29 ENCOUNTER — EMERGENCY (EMERGENCY)
Facility: HOSPITAL | Age: 31
LOS: 1 days | Discharge: HOME | End: 2021-07-29
Attending: EMERGENCY MEDICINE
Payer: MEDICAID

## 2021-07-29 VITALS
WEIGHT: 130.07 LBS | OXYGEN SATURATION: 98 % | SYSTOLIC BLOOD PRESSURE: 124 MMHG | TEMPERATURE: 98 F | HEIGHT: 67 IN | DIASTOLIC BLOOD PRESSURE: 83 MMHG | RESPIRATION RATE: 16 BRPM | HEART RATE: 73 BPM

## 2021-07-29 PROCEDURE — 99284 EMERGENCY DEPT VISIT MOD MDM: CPT

## 2021-07-29 PROCEDURE — 94640 AIRWAY INHALATION TREATMENT: CPT

## 2021-07-29 PROCEDURE — 99283 EMERGENCY DEPT VISIT LOW MDM: CPT | Mod: 25

## 2021-07-29 RX ORDER — IPRATROPIUM/ALBUTEROL SULFATE 18-103MCG
3 AEROSOL WITH ADAPTER (GRAM) INHALATION
Refills: 0 | Status: COMPLETED | OUTPATIENT
Start: 2021-07-29 | End: 2021-07-29

## 2021-07-29 RX ORDER — ALBUTEROL 90 UG/1
2 AEROSOL, METERED ORAL ONCE
Refills: 0 | Status: DISCONTINUED | OUTPATIENT
Start: 2021-07-29 | End: 2021-08-01

## 2021-07-29 RX ADMIN — Medication 3 MILLILITER(S): at 12:57

## 2021-07-29 RX ADMIN — Medication 40 MILLIGRAM(S): at 12:57

## 2021-07-29 RX ADMIN — Medication 3 MILLILITER(S): at 13:24

## 2021-07-29 NOTE — ED PROVIDER NOTE - OBJECTIVE STATEMENT
30 y.o. male c/o sob for past 3 days, coughing up yellowish sputum, wheezing, no fever, no COVID vaccine.  Pt used inhaler with no relief

## 2021-07-29 NOTE — ED ADULT NURSE NOTE - OBJECTIVE STATEMENT
Present to ED c/o asthma attack x3 associated with chest tightness and difficulty taking deep breath. Present to ED c/o asthma attack x3 associated with chest tightness and difficulty taking deep breath. Patient sitting up on chair, denies pain/discomfort, no acute distress noted.

## 2021-07-29 NOTE — ED PROVIDER NOTE - PATIENT PORTAL LINK FT
You can access the FollowMyHealth Patient Portal offered by [facility] by registering at the following website: [email or web address]. By joining [employer]’s FollowMyHealth portal, you will also be able to view your health information using other applications (apps) compatible with our system.

## 2021-08-19 ENCOUNTER — EMERGENCY (EMERGENCY)
Facility: HOSPITAL | Age: 31
LOS: 1 days | Discharge: HOME | End: 2021-08-19
Attending: EMERGENCY MEDICINE
Payer: MEDICAID

## 2021-08-19 VITALS
HEIGHT: 67 IN | RESPIRATION RATE: 20 BRPM | DIASTOLIC BLOOD PRESSURE: 60 MMHG | WEIGHT: 130.07 LBS | HEART RATE: 65 BPM | OXYGEN SATURATION: 98 % | SYSTOLIC BLOOD PRESSURE: 102 MMHG | TEMPERATURE: 98 F

## 2021-08-19 PROCEDURE — 94640 AIRWAY INHALATION TREATMENT: CPT

## 2021-08-19 PROCEDURE — 99284 EMERGENCY DEPT VISIT MOD MDM: CPT

## 2021-08-19 PROCEDURE — 99283 EMERGENCY DEPT VISIT LOW MDM: CPT

## 2021-08-19 RX ORDER — LORATADINE 10 MG/1
1 TABLET ORAL
Qty: 30 | Refills: 0
Start: 2021-08-19 | End: 2021-09-17

## 2021-08-19 RX ORDER — LORATADINE 10 MG/1
10 TABLET ORAL ONCE
Refills: 0 | Status: COMPLETED | OUTPATIENT
Start: 2021-08-19 | End: 2021-08-19

## 2021-08-19 RX ORDER — ALBUTEROL 90 UG/1
2 AEROSOL, METERED ORAL ONCE
Refills: 0 | Status: COMPLETED | OUTPATIENT
Start: 2021-08-19 | End: 2021-08-19

## 2021-08-19 RX ADMIN — ALBUTEROL 2 PUFF(S): 90 AEROSOL, METERED ORAL at 14:14

## 2021-08-19 RX ADMIN — LORATADINE 10 MILLIGRAM(S): 10 TABLET ORAL at 14:13

## 2021-08-19 NOTE — ED ADULT NURSE NOTE - CHIEF COMPLAINT QUOTE
pt is saying " im having an asthma attack " c/o sob and wheezing, no obvious wheezing noted on triage

## 2021-08-19 NOTE — ED PROVIDER NOTE - OBJECTIVE STATEMENT
29 y/o male with PMHx asthma never requiring hospitalization or intubation with albuterol inhaler at home presents to ED c/o wheezing. Patient states taking inhaler every x2-3 days as needed for wheezing. Patient notes recently ran out of inhaler about x3 days ago with last use and now presents with shortness of breath and wheezing. Patient states asthma usually triggered seasonally 2/2 allergies. Patient denies daily medication for allergies. Patient denies fever, cough and chest pain. NKDA.

## 2021-08-19 NOTE — ED PROVIDER NOTE - CLINICAL SUMMARY MEDICAL DECISION MAKING FREE TEXT BOX
30M presenting with CC of wheezing/SOB. On exam, VSS, no WOB, trace wheezes. Will give pt albuterol and claritin, reassess. Based on exam and story, no need for imaging at this time, likely asthma. Likely DC w follow up info. eDz Kirk, DO

## 2021-08-19 NOTE — ED PROVIDER NOTE - NSFOLLOWUPINSTRUCTIONS_ED_ALL_ED_FT
Please  your asthma medications and take as directed.     Return to the ER for new or worsening shortness of breath, cough, fevers, or any other concerning symptoms.     See attached information on asthma.     Follow up with your PCP as discussed.

## 2021-08-19 NOTE — ED PROVIDER NOTE - PATIENT PORTAL LINK FT
You can access the FollowMyHealth Patient Portal offered by [facility] by registering at the following website: [email or web address]. By joining [employer]’s FollowMyHealth portal, you will also be able to view your health information using other applications (apps) compatible with our system.

## 2021-08-19 NOTE — ED ADULT NURSE NOTE - OBJECTIVE STATEMENT
pt is a 31 y/o male with c/o  shortness of breath pt no labored breathing noted in the ED . pt looks fairly comfortable.

## 2021-08-19 NOTE — ED ADULT TRIAGE NOTE - CHIEF COMPLAINT QUOTE
pt is saying " im having an asthma attack " c/o sob and wheezing pt is saying " im having an asthma attack " c/o sob and wheezing, no obvious wheezing noted on triage

## 2021-08-30 ENCOUNTER — EMERGENCY (EMERGENCY)
Facility: HOSPITAL | Age: 31
LOS: 1 days | Discharge: HOME | End: 2021-08-30
Attending: EMERGENCY MEDICINE
Payer: MEDICAID

## 2021-08-30 VITALS
DIASTOLIC BLOOD PRESSURE: 74 MMHG | HEART RATE: 66 BPM | OXYGEN SATURATION: 100 % | HEIGHT: 67 IN | SYSTOLIC BLOOD PRESSURE: 119 MMHG | WEIGHT: 130.07 LBS | RESPIRATION RATE: 20 BRPM | TEMPERATURE: 98 F

## 2021-08-30 PROCEDURE — 99283 EMERGENCY DEPT VISIT LOW MDM: CPT

## 2021-08-30 PROCEDURE — 99284 EMERGENCY DEPT VISIT MOD MDM: CPT

## 2021-08-30 PROCEDURE — 94640 AIRWAY INHALATION TREATMENT: CPT

## 2021-08-30 RX ORDER — ALBUTEROL 90 UG/1
6 AEROSOL, METERED ORAL ONCE
Refills: 0 | Status: COMPLETED | OUTPATIENT
Start: 2021-08-30 | End: 2021-08-30

## 2021-08-30 RX ADMIN — ALBUTEROL 6 PUFF(S): 90 AEROSOL, METERED ORAL at 11:38

## 2021-08-30 NOTE — ED PROVIDER NOTE - PATIENT PORTAL LINK FT
You can access the FollowMyHealth Patient Portal offered by [facility] by registering at the following website: [email or web address]. By joining [employer]’s FollowMyHealth portal, you will also be able to view your health information using other applications (apps) compatible with our system.

## 2021-08-30 NOTE — ED PROVIDER NOTE - PHYSICAL EXAMINATION
Afebrile, hemodynamically stable, saturating well  NAD, well appearing, sitting comfortably in bed, no WOB, speaking full sentences  Head NCAT  EOMI grossly, anicteric  MMM  No JVD  RRR, nml S1/S2, no m/r/g  Lungs CTAB, no w/r/r, with good air mvmt  Abd soft, NT, ND, nml BS, no rebound or guarding  AAO, CN's 3-12 grossly intact  TAPIA spontaneously, no leg cyanosis or edema  Skin warm, well perfused, no rashes or hives

## 2021-08-30 NOTE — ED PROVIDER NOTE - CLINICAL SUMMARY MEDICAL DECISION MAKING FREE TEXT BOX
Pt with h/o asthma with multiple exacerbations and regular ED use, ran out of inhaler. No focality on exam with low suspicion for PNA. No e/o PTX or fluid overload. PERC negative. Patient is well appearing, NAD, afebrile, hemodynamically stable, no WOB or desats. Mild exacerbation. Given albuterol inhaler to use and take with him. Discharged with instructions in further symptomatic care, return precautions, and need for PMD f/u.

## 2021-08-30 NOTE — ED PROVIDER NOTE - OBJECTIVE STATEMENT
30yoM with h/o asthma presents for albuterol as he states he is having an asthma exacerbation, chest tightness and SOB c/w all past exacerbations. States worsened since he ran out of his inhaler 2-3 days ago. Denies fever, cough, smoking, and all other symptoms. Homeless.

## 2021-08-30 NOTE — ED ADULT NURSE NOTE - ED STAT RN HANDOFF DETAILS
Patient discharged home as per MD order after MDI administration. All discharge instructions and F/U visits provided to patient. Patient verbalizes understanding leaving ambulatory in no acute distress.

## 2021-08-30 NOTE — ED ADULT NURSE NOTE - OBJECTIVE STATEMENT
Patient present to ED with asthma exacerbation , has been having SOB for past 3-4 days but did not have his MDI

## 2021-08-30 NOTE — ED PROVIDER NOTE - NSFOLLOWUPINSTRUCTIONS_ED_ALL_ED_FT
Please follow up with your primary care doctor in 1-2 days.  Please use albuterol every 4 hours as needed for shortness of breath.  Please return to the emergency department if you have worsening shortness of breath or pain, dizziness, vomiting, fever, or any other symptoms.        Shortness of Breath    WHAT YOU NEED TO KNOW:    Shortness of breath is a feeling that you cannot get enough air when you breathe in. You may have this feeling only during activity, or all the time. Your symptoms can range from mild to severe. Shortness of breath may be a sign of a serious health condition that needs immediate care.    DISCHARGE INSTRUCTIONS:    Return to the emergency department if:   •Your signs and symptoms are the same or worse within 24 hours of treatment.   •The skin over your ribs or on your neck sinks in when you breathe.   •You feel confused or dizzy.    Contact your healthcare provider if:   •You have new or worsening symptoms.  •You have questions or concerns about your condition or care.    Medicines:   •Medicines may be used to treat the cause of your symptoms. You may need medicine to treat a bacterial infection or reduce anxiety. Other medicines may be used to open your airway, reduce swelling, or remove extra fluid. If you have a heart condition, you may need medicine to help your heart beat more strongly or regularly.  •Take your medicine as directed. Contact your healthcare provider if you think your medicine is not helping or if you have side effects. Tell him or her if you are allergic to any medicine. Keep a list of the medicines, vitamins, and herbs you take. Include the amounts, and when and why you take them. Bring the list or the pill bottles to follow-up visits. Carry your medicine list with you in case of an emergency.    Manage shortness of breath:   •Create an action plan. You and your healthcare provider can work together to create a plan for how to handle shortness of breath. The plan can include daily activities, treatment changes, and what to do if you have severe breathing problems.  •Lean forward on your elbows when you sit. This helps your lungs expand and may make it easier to breathe.  •Use pursed-lip breathing any time you feel short of breath. Breathe in through your nose and then slowly breathe out through your mouth with your lips slightly puckered. It should take you twice as long to breathe out as it did to breathe in.   •Do not smoke. Nicotine and other chemicals in cigarettes and cigars can cause lung damage and make shortness of breath worse. Ask your healthcare provider for information if you currently smoke and need help to quit. E-cigarettes or smokeless tobacco still contain nicotine. Talk to your healthcare provider before you use these products.  •Reach or maintain a healthy weight. Your healthcare provider can help you create a safe weight loss plan if you are overweight.  •Exercise as directed. Exercise can help your lungs work more easily. Exercise can also help you lose weight if needed. Try to get at least 30 minutes of exercise most days of the week.    Follow up with your healthcare provider or specialist as directed: Write down your questions so you remember to ask them during your visits.

## 2021-09-07 ENCOUNTER — EMERGENCY (EMERGENCY)
Facility: HOSPITAL | Age: 31
LOS: 1 days | Discharge: HOME | End: 2021-09-07
Attending: STUDENT IN AN ORGANIZED HEALTH CARE EDUCATION/TRAINING PROGRAM
Payer: MEDICAID

## 2021-09-07 VITALS
HEIGHT: 67 IN | WEIGHT: 130.07 LBS | DIASTOLIC BLOOD PRESSURE: 62 MMHG | OXYGEN SATURATION: 97 % | HEART RATE: 66 BPM | RESPIRATION RATE: 16 BRPM | TEMPERATURE: 98 F | SYSTOLIC BLOOD PRESSURE: 109 MMHG

## 2021-09-07 PROCEDURE — 94640 AIRWAY INHALATION TREATMENT: CPT

## 2021-09-07 PROCEDURE — 99284 EMERGENCY DEPT VISIT MOD MDM: CPT

## 2021-09-07 PROCEDURE — 99281 EMR DPT VST MAYX REQ PHY/QHP: CPT

## 2021-09-07 RX ORDER — ALBUTEROL 90 UG/1
2 AEROSOL, METERED ORAL ONCE
Refills: 0 | Status: COMPLETED | OUTPATIENT
Start: 2021-09-07 | End: 2021-09-07

## 2021-09-07 RX ADMIN — ALBUTEROL 2 PUFF(S): 90 AEROSOL, METERED ORAL at 13:04

## 2021-09-07 NOTE — ED PROVIDER NOTE - CLINICAL SUMMARY MEDICAL DECISION MAKING FREE TEXT BOX
31 year-old male, presents with request for Albuterol refill. On exam, no wheezing, tachypnea or signs of distress. O2 sat 97% RA. Has been to the ER x 2 other times for similar reason. Will refer to pulmonologist for evaluation and management of his asthma.

## 2021-09-07 NOTE — ED PROVIDER NOTE - PATIENT PORTAL LINK FT
You can access the FollowMyHealth Patient Portal offered by [facility] by registering at the following website: [email or web address]. By joining [employer]’s FollowMyHealth portal, you will also be able to view your health information using other applications (apps) compatible with our system.

## 2021-09-07 NOTE — ED PROVIDER NOTE - NSFOLLOWUPCLINICS_GEN_ALL_ED_FT
[facility]  Pulmonary Medicine  [address] [place] [address]  Phone: [phone]  Fax: [phone]

## 2021-09-07 NOTE — ED PROVIDER NOTE - NSFOLLOWUPINSTRUCTIONS_ED_ALL_ED_FT
Follow up with the pulmonologist (lung doctor) within 1 week.    If you experience any new or worsening symptoms or if you are concerned you can always come back to the emergency for a re-evaluation.

## 2021-09-22 ENCOUNTER — EMERGENCY (EMERGENCY)
Facility: HOSPITAL | Age: 31
LOS: 1 days | Discharge: HOME | End: 2021-09-22
Attending: EMERGENCY MEDICINE | Admitting: EMERGENCY MEDICINE
Payer: MEDICAID

## 2021-09-22 VITALS
HEART RATE: 78 BPM | SYSTOLIC BLOOD PRESSURE: 120 MMHG | OXYGEN SATURATION: 95 % | WEIGHT: 130.07 LBS | DIASTOLIC BLOOD PRESSURE: 75 MMHG | RESPIRATION RATE: 18 BRPM | TEMPERATURE: 98 F | HEIGHT: 67 IN

## 2021-09-22 VITALS
TEMPERATURE: 98 F | RESPIRATION RATE: 18 BRPM | OXYGEN SATURATION: 100 % | HEART RATE: 69 BPM | DIASTOLIC BLOOD PRESSURE: 76 MMHG | SYSTOLIC BLOOD PRESSURE: 115 MMHG

## 2021-09-22 DIAGNOSIS — R06.02 SHORTNESS OF BREATH: ICD-10-CM

## 2021-09-22 DIAGNOSIS — R06.2 WHEEZING: ICD-10-CM

## 2021-09-22 DIAGNOSIS — J45.901 UNSPECIFIED ASTHMA WITH (ACUTE) EXACERBATION: ICD-10-CM

## 2021-09-22 PROCEDURE — 99285 EMERGENCY DEPT VISIT HI MDM: CPT | Mod: 25

## 2021-09-22 PROCEDURE — 94640 AIRWAY INHALATION TREATMENT: CPT

## 2021-09-22 PROCEDURE — 99284 EMERGENCY DEPT VISIT MOD MDM: CPT

## 2021-09-22 RX ORDER — FLUTICASONE PROPIONATE 220 MCG
1 AEROSOL WITH ADAPTER (GRAM) INHALATION
Qty: 1 | Refills: 0
Start: 2021-09-22 | End: 2021-10-21

## 2021-09-22 RX ORDER — IPRATROPIUM/ALBUTEROL SULFATE 18-103MCG
3 AEROSOL WITH ADAPTER (GRAM) INHALATION
Refills: 0 | Status: COMPLETED | OUTPATIENT
Start: 2021-09-22 | End: 2021-09-22

## 2021-09-22 RX ORDER — ALBUTEROL 90 UG/1
2 AEROSOL, METERED ORAL
Qty: 1 | Refills: 0
Start: 2021-09-22 | End: 2021-10-21

## 2021-09-22 RX ADMIN — Medication 3 MILLILITER(S): at 19:03

## 2021-09-22 RX ADMIN — Medication 3 MILLILITER(S): at 19:23

## 2021-09-22 RX ADMIN — Medication 3 MILLILITER(S): at 19:37

## 2021-09-22 NOTE — ED ADULT NURSE NOTE - NEURO MENTATION
.  Is a 71-year-old patient of Dr. Samara Harding who had a total hip replacement approximately 2 weeks ago he came in last week because he is complaining of some issues with his wound at that time I examined him and everything looks okay not complaining about soreness around the hip.  Denies any trauma or sensations that are similar to dislocations.  He denies chest pain shortness of breath or calf pains.    Clinically: His right hip wound is healing very well staples intact without dehiscence there is no drainage no signs of infection.  He had good range of motion of his hip no palpable tenderness.  His leg lengths were near equal.  His calf was soft nontender there was no signs of a DVT.  He had good anatomical alignment and no signs of dislocation.  His gait was guarded and he uses cane.    Plan: Staples removed wound care recommended.  We gave him a order for physical therapy to help strengthen and improve his range of motion is to return to see Dr. Samara Harding in about a month for follow-up x-rays.   normal

## 2021-09-22 NOTE — ED PROVIDER NOTE - OBJECTIVE STATEMENT
30 y/o M w/ hx poorly controlled asthma, many ER visits, denies hospital admissions or intubations, presents w/ SOB, wheezing consistent w/ asthma flare. Reports no precipitating factor, unsure if he's ever been on controller medications, no inhaler. Patient currently undomiciled, does not note any triggers but states that this summer was really bad for his asthma.

## 2021-09-22 NOTE — ED ADULT TRIAGE NOTE - CHIEF COMPLAINT QUOTE
Patient c/o asthma attack , cough and sob for 2 days , been using albuterol inhaler with no relief . Denies any chest pain , fever nor chills .

## 2021-09-22 NOTE — ED PROVIDER NOTE - PHYSICAL EXAMINATION
CONSTITUTIONAL: Well-appearing; well-nourished; in no apparent distress.   HEAD: Normocephalic; atraumatic.   EYES:  conjunctiva and sclera clear  ENT: normal nose; no rhinorrhea;  NECK: Supple; full ROM  RESPIRATORY: Breathing easily; no resp difficulty; speaking in full sentences; bilateral wheezes to lower lobes  EXT: No cyanosis or edema;  SKIN: Normal for age and race; warm; dry; good turgor; no apparent lesions or rash.   NEURO: A & O x 3; face symmetric; grossly unremarkable.   PSYCHOLOGICAL: The patient’s mood and manner are appropriate.

## 2021-09-22 NOTE — ED PROVIDER NOTE - PATIENT PORTAL LINK FT
You can access the FollowMyHealth Patient Portal offered by [facility] by registering at the following website: [email or web address]. By joining [employer]’s FollowMyHealth portal, you will also be able to view your health information using other applications (apps) compatible with our system.

## 2021-09-22 NOTE — ED PROVIDER NOTE - CLINICAL SUMMARY MEDICAL DECISION MAKING FREE TEXT BOX
30 y/o M, hx poorly controlled asthma, presents w/ SOB and wheezing. Plan to give duonebs and reassess. Consider starting controller meds in ER as patient is not compliant with outpatient follow ups.

## 2021-09-22 NOTE — ED ADULT NURSE NOTE - OBJECTIVE STATEMENT
31 y.o M a&ox4 walked in from triage c.o asthma. x 2 weeks of cough, and congestions. pt reports using inhaler more frequently over the past 2 weeks without relief. + expiratory wheeze bilaterally. speaking in clear, full appropriate sentences, airway patent. no distress at this time.

## 2021-09-26 ENCOUNTER — EMERGENCY (EMERGENCY)
Facility: HOSPITAL | Age: 31
LOS: 1 days | Discharge: LEFT BEFORE DISCHARGE | End: 2021-09-26
Attending: EMERGENCY MEDICINE
Payer: MEDICAID

## 2021-09-26 VITALS
SYSTOLIC BLOOD PRESSURE: 113 MMHG | HEART RATE: 76 BPM | HEIGHT: 67 IN | DIASTOLIC BLOOD PRESSURE: 62 MMHG | OXYGEN SATURATION: 96 % | RESPIRATION RATE: 16 BRPM | TEMPERATURE: 98 F | WEIGHT: 123.9 LBS

## 2021-09-26 PROCEDURE — 99282 EMERGENCY DEPT VISIT SF MDM: CPT

## 2021-09-26 PROCEDURE — 99284 EMERGENCY DEPT VISIT MOD MDM: CPT

## 2021-09-26 RX ORDER — ALBUTEROL 90 UG/1
2 AEROSOL, METERED ORAL ONCE
Refills: 0 | Status: DISCONTINUED | OUTPATIENT
Start: 2021-09-26 | End: 2021-09-29

## 2021-09-26 NOTE — ED PROVIDER NOTE - PRINCIPAL DIAGNOSIS
Prednisone Pregnancy And Lactation Text: This medication is Pregnancy Category C and it isn't know if it is safe during pregnancy. This medication is excreted in breast milk. URI, acute

## 2021-09-26 NOTE — ED PROVIDER NOTE - CLINICAL SUMMARY MEDICAL DECISION MAKING FREE TEXT BOX
32yo w asthma w URI stx, PF in triage 370. no wheezing on exam but decreased BS to L lung. Will get CXR, Covid swab, treat w Albuterol

## 2021-09-26 NOTE — ED PROVIDER NOTE - OBJECTIVE STATEMENT
30yo M w hx of asthma, in the ED for cough and SOB for 3 days. No other URI stx, no CP. Typical asthma exacerbation as per patient. Not vaccinated, never had Covid infection

## 2021-09-26 NOTE — ED ADULT NURSE NOTE - OBJECTIVE STATEMENT
As per pt, c/o SOB today. PT is able to complete sentences without difficulty. PT denies all other symptoms. No distress noted, no accessory muscle use noted. PT admits he used his last dose on his inhaler last week.

## 2021-09-26 NOTE — ED ADULT TRIAGE NOTE - CHIEF COMPLAINT QUOTE
patient says he woke up with difficulty breathing, has h/o asthma, no distress on arrival, Peak flow 370 with good effort

## 2021-09-26 NOTE — ED PROVIDER NOTE - PHYSICAL EXAMINATION
Well appearing, in NAD  Moist mucosae  Pink conjunctivae  HEENT wnl  decreased BS to L lung, no wheezing  Cardiac w RRR, no JVD  Abdomen soft/NT  No CVAT  No pedal edema, no calf TTP  Neck supple  AAOx3, no gross neuro deficits

## 2021-10-05 NOTE — ED ADULT NURSE NOTE - NSIMPLEMENTINTERV_GEN_ALL_ED
Implemented All Universal Safety Interventions:  [place] to call system. Call bell, personal items and telephone within reach. Instruct patient to call for assistance. Room bathroom lighting operational. Non-slip footwear when patient is off stretcher. Physically safe environment: no spills, clutter or unnecessary equipment. Stretcher in lowest position, wheels locked, appropriate side rails in place. Unable to assess

## 2021-10-15 ENCOUNTER — EMERGENCY (EMERGENCY)
Facility: HOSPITAL | Age: 31
LOS: 1 days | Discharge: HOME | End: 2021-10-15
Attending: STUDENT IN AN ORGANIZED HEALTH CARE EDUCATION/TRAINING PROGRAM
Payer: MEDICAID

## 2021-10-15 VITALS
OXYGEN SATURATION: 100 % | TEMPERATURE: 98 F | HEART RATE: 73 BPM | DIASTOLIC BLOOD PRESSURE: 77 MMHG | HEIGHT: 67 IN | WEIGHT: 130.07 LBS | SYSTOLIC BLOOD PRESSURE: 115 MMHG | RESPIRATION RATE: 20 BRPM

## 2021-10-15 LAB
ALBUMIN SERPL ELPH-MCNC: 3.5 G/DL — SIGNIFICANT CHANGE UP (ref 3.5–5)
ALP SERPL-CCNC: 75 U/L — SIGNIFICANT CHANGE UP (ref 40–120)
ALT FLD-CCNC: 26 U/L DA — SIGNIFICANT CHANGE UP (ref 10–60)
ANION GAP SERPL CALC-SCNC: 3 MMOL/L — LOW (ref 5–17)
AST SERPL-CCNC: 17 U/L — SIGNIFICANT CHANGE UP (ref 10–40)
BASOPHILS # BLD AUTO: 0.04 K/UL — SIGNIFICANT CHANGE UP (ref 0–0.2)
BASOPHILS NFR BLD AUTO: 0.6 % — SIGNIFICANT CHANGE UP (ref 0–2)
BILIRUB SERPL-MCNC: 0.7 MG/DL — SIGNIFICANT CHANGE UP (ref 0.2–1.2)
BUN SERPL-MCNC: 20 MG/DL — HIGH (ref 7–18)
CALCIUM SERPL-MCNC: 9.2 MG/DL — SIGNIFICANT CHANGE UP (ref 8.4–10.5)
CHLORIDE SERPL-SCNC: 109 MMOL/L — HIGH (ref 96–108)
CO2 SERPL-SCNC: 28 MMOL/L — SIGNIFICANT CHANGE UP (ref 22–31)
CREAT SERPL-MCNC: 0.98 MG/DL — SIGNIFICANT CHANGE UP (ref 0.5–1.3)
EOSINOPHIL # BLD AUTO: 0.09 K/UL — SIGNIFICANT CHANGE UP (ref 0–0.5)
EOSINOPHIL NFR BLD AUTO: 1.4 % — SIGNIFICANT CHANGE UP (ref 0–6)
GLUCOSE SERPL-MCNC: 76 MG/DL — SIGNIFICANT CHANGE UP (ref 70–99)
HCT VFR BLD CALC: 42.6 % — SIGNIFICANT CHANGE UP (ref 39–50)
HGB BLD-MCNC: 13.1 G/DL — SIGNIFICANT CHANGE UP (ref 13–17)
IMM GRANULOCYTES NFR BLD AUTO: 0.5 % — SIGNIFICANT CHANGE UP (ref 0–1.5)
LYMPHOCYTES # BLD AUTO: 2.68 K/UL — SIGNIFICANT CHANGE UP (ref 1–3.3)
LYMPHOCYTES # BLD AUTO: 40.5 % — SIGNIFICANT CHANGE UP (ref 13–44)
MCHC RBC-ENTMCNC: 27.9 PG — SIGNIFICANT CHANGE UP (ref 27–34)
MCHC RBC-ENTMCNC: 30.8 GM/DL — LOW (ref 32–36)
MCV RBC AUTO: 90.6 FL — SIGNIFICANT CHANGE UP (ref 80–100)
MONOCYTES # BLD AUTO: 0.48 K/UL — SIGNIFICANT CHANGE UP (ref 0–0.9)
MONOCYTES NFR BLD AUTO: 7.3 % — SIGNIFICANT CHANGE UP (ref 2–14)
NEUTROPHILS # BLD AUTO: 3.3 K/UL — SIGNIFICANT CHANGE UP (ref 1.8–7.4)
NEUTROPHILS NFR BLD AUTO: 49.7 % — SIGNIFICANT CHANGE UP (ref 43–77)
NRBC # BLD: 0 /100 WBCS — SIGNIFICANT CHANGE UP (ref 0–0)
PLATELET # BLD AUTO: 172 K/UL — SIGNIFICANT CHANGE UP (ref 150–400)
POTASSIUM SERPL-MCNC: 3.5 MMOL/L — SIGNIFICANT CHANGE UP (ref 3.5–5.3)
POTASSIUM SERPL-SCNC: 3.5 MMOL/L — SIGNIFICANT CHANGE UP (ref 3.5–5.3)
PROT SERPL-MCNC: 6.7 G/DL — SIGNIFICANT CHANGE UP (ref 6–8.3)
RBC # BLD: 4.7 M/UL — SIGNIFICANT CHANGE UP (ref 4.2–5.8)
RBC # FLD: 12 % — SIGNIFICANT CHANGE UP (ref 10.3–14.5)
SARS-COV-2 RNA SPEC QL NAA+PROBE: SIGNIFICANT CHANGE UP
SODIUM SERPL-SCNC: 140 MMOL/L — SIGNIFICANT CHANGE UP (ref 135–145)
WBC # BLD: 6.62 K/UL — SIGNIFICANT CHANGE UP (ref 3.8–10.5)
WBC # FLD AUTO: 6.62 K/UL — SIGNIFICANT CHANGE UP (ref 3.8–10.5)

## 2021-10-15 PROCEDURE — 94640 AIRWAY INHALATION TREATMENT: CPT

## 2021-10-15 PROCEDURE — 80053 COMPREHEN METABOLIC PANEL: CPT

## 2021-10-15 PROCEDURE — 99284 EMERGENCY DEPT VISIT MOD MDM: CPT

## 2021-10-15 PROCEDURE — 85025 COMPLETE CBC W/AUTO DIFF WBC: CPT

## 2021-10-15 PROCEDURE — 36415 COLL VENOUS BLD VENIPUNCTURE: CPT

## 2021-10-15 PROCEDURE — 99283 EMERGENCY DEPT VISIT LOW MDM: CPT | Mod: 25

## 2021-10-15 PROCEDURE — 87635 SARS-COV-2 COVID-19 AMP PRB: CPT

## 2021-10-15 RX ORDER — ALBUTEROL 90 UG/1
2.5 AEROSOL, METERED ORAL
Refills: 0 | Status: DISCONTINUED | OUTPATIENT
Start: 2021-10-15 | End: 2021-10-19

## 2021-10-15 RX ADMIN — Medication 125 MILLIGRAM(S): at 18:38

## 2021-10-15 RX ADMIN — ALBUTEROL 2.5 MILLIGRAM(S): 90 AEROSOL, METERED ORAL at 18:38

## 2021-10-15 NOTE — ED ADULT TRIAGE NOTE - CHIEF COMPLAINT QUOTE
"Im having an asthma attack" since 0700 today. Pt speaking in full sentences, no respiratory distress noted.

## 2021-10-15 NOTE — ED PROVIDER NOTE - NSFOLLOWUPINSTRUCTIONS_ED_ALL_ED_FT
You were seen in the emergency department for: shortness of breath  Your diagnosis for this visit was: asthma exacerbation  From this ED visit you were prescribed: Prednisone    You may be contacted by the Emergency Department Referrals Coordinator to set up your follow-up appointment within 24-48 hours of your discharge, Monday to Friday. We recommend you follow up with: your primary care doctor.    Please return to the Emergency Department if you experience any of the following symptoms:   - Shortness of breath or trouble breathing  - Pressure, pain or tightness in the chest  - Face drooping, arm weakness or speech difficulty  - Persistence of severe vomiting  - Head injury or loss of consciousness  - Nonstop bleeding or an open wound    (1) Follow up with your primary care physician within the next 24-48 hours as discussed. In addition, we did not find evidence of a life threatening illness on your testing here today, but listed below are the specialists that will be necessary to see as an outpatient to continue the workup.  Please call the numbers listed below or [phone] to set up the necessary appointments.  (2) Take Tylenol (up to 1000mg or 1 g)  and/or Motrin (up to 600mg) up to every 6 hours as needed for pain.   (3) If you had an IV (intravenous) line placed, it was removed. Sometimes, after IV removal, that area can be tender for a few days; if it develops redness and swelling, those could be signs of infection; in which case, return to the Emergency Department for assessment.  (4) Please continue taking all of your home medications as directed.

## 2021-10-15 NOTE — ED PROVIDER NOTE - CARE PLAN
[id number]: Principal Discharge DX:	Shortness of breath   1

## 2021-10-15 NOTE — ED PROVIDER NOTE - OBJECTIVE STATEMENT
31-year-old male hx of asthma (never hospitalized or intubated) presenting with shortness of breath/chest tightness x 2-3 days. Feels like asthma attack. Denies fevers, chills, cough, runny nose. Denies any other symptoms.

## 2021-10-15 NOTE — ED PROVIDER NOTE - CLINICAL SUMMARY MEDICAL DECISION MAKING FREE TEXT BOX
31-year-old male hx of asthma (never hospitalized or intubated) presenting with asthma attack, no respiratory distress. Will check labs, COVID swab, and chest xray, provide Albuterol/Solumedrol and reassess. Anticipate discharge.

## 2021-11-06 ENCOUNTER — EMERGENCY (EMERGENCY)
Facility: HOSPITAL | Age: 31
LOS: 1 days | Discharge: HOME | End: 2021-11-06
Attending: EMERGENCY MEDICINE
Payer: MEDICAID

## 2021-11-06 VITALS
WEIGHT: 130.07 LBS | TEMPERATURE: 99 F | HEIGHT: 67 IN | RESPIRATION RATE: 16 BRPM | HEART RATE: 60 BPM | SYSTOLIC BLOOD PRESSURE: 112 MMHG | OXYGEN SATURATION: 97 % | DIASTOLIC BLOOD PRESSURE: 76 MMHG

## 2021-11-06 PROCEDURE — 94640 AIRWAY INHALATION TREATMENT: CPT

## 2021-11-06 PROCEDURE — 99283 EMERGENCY DEPT VISIT LOW MDM: CPT | Mod: 25

## 2021-11-06 PROCEDURE — 99284 EMERGENCY DEPT VISIT MOD MDM: CPT

## 2021-11-06 RX ORDER — ALBUTEROL 90 UG/1
1 AEROSOL, METERED ORAL ONCE
Refills: 0 | Status: COMPLETED | OUTPATIENT
Start: 2021-11-06 | End: 2021-11-06

## 2021-11-06 RX ADMIN — ALBUTEROL 1 PUFF(S): 90 AEROSOL, METERED ORAL at 18:02

## 2021-11-06 NOTE — ED ADULT NURSE NOTE - NSIMPLEMENTINTERV_GEN_ALL_ED
Implemented All Universal Safety Interventions:  [place] to call system. Call bell, personal items and telephone within reach. Instruct patient to call for assistance. Room bathroom lighting operational. Non-slip footwear when patient is off stretcher. Physically safe environment: no spills, clutter or unnecessary equipment. Stretcher in lowest position, wheels locked, appropriate side rails in place.

## 2021-11-06 NOTE — ED PROVIDER NOTE - NSFOLLOWUPINSTRUCTIONS_ED_ALL_ED_FT
Asthma    WHAT YOU NEED TO KNOW:    Asthma is a lung disease that makes breathing difficult. Chronic inflammation and reactions to triggers narrow the airways in the lungs. Asthma can become life-threatening if it is not managed.    Normal vs Asthmatic Bronchioles Adult         DISCHARGE INSTRUCTIONS:    Call your local emergency number (911 in the [place]) if:   •You have severe shortness of breath.      •The skin around your neck and ribs pulls in with each breath.      •Your peak flow numbers are in the red zone of your AAP.      Return to the emergency department if:   •You have shortness of breath, even after you take your short-term medicine as directed.      •Your lips or nails turn blue or gray.      Call your doctor or asthma specialist if:   •You run out of medicine before your next refill is due.      •Your symptoms get worse.      •You need to take more medicine than usual to control your symptoms.      •You have questions or concerns about your condition or care.      Medicines:   •Medicines may be used to decrease inflammation, open airways, and make it easier to breathe. Medicines may be inhaled, taken as a pill, or injected. Short-term medicines relieve your symptoms quickly. Long-term medicines are used to prevent future asthma attacks. Other medicines may be needed if your regular medicines are not able to prevent attacks. You may also need medicine to help control your allergies. Ask your healthcare provider for more information about any medicine you are given and how to take it safely.      •Take your medicine as directed. Contact your healthcare provider if you think your medicine is not helping or if you have side effects. Tell him of her if you are allergic to any medicine. Keep a list of the medicines, vitamins, and herbs you take. Include the amounts, and when and why you take them. Bring the list or the pill bottles to follow-up visits. Carry your medicine list with you in case of an emergency.      Manage your symptoms and prevent future attacks:   •Follow your Asthma Action Plan (AAP). This is a written plan that you and your healthcare provider create. It explains which medicine you need and when to change doses if necessary. It also explains how you can monitor symptoms and use a peak flow meter. The meter measures how well your lungs are working.      •Manage other health conditions, such as allergies, acid reflux, and sleep apnea.      •Identify and avoid triggers. These may include pets, dust mites, mold, and cockroaches.      •Do not smoke or be around others who smoke. Nicotine and other chemicals in cigarettes and cigars can cause lung damage. Ask your healthcare provider for information if you currently smoke and need help to quit. E-cigarettes or smokeless tobacco still contain nicotine. Talk to your healthcare provider before you use these products.      •Ask about the flu vaccine. The flu can make your asthma worse. You may need a yearly flu shot.      Follow up with your healthcare provider as directed: You will need to return to make sure your medicine is working and your symptoms are controlled. You may be referred to an asthma or allergy specialist. You may be asked to keep a record of your peak flow values and bring it with you to your appointments. Write down your questions so you remember to ask them during your visits.       © Copyright [employer] 2021           back to top                          © Copyright [employer] 2021

## 2021-11-06 NOTE — ED PROVIDER NOTE - OBJECTIVE STATEMENT
Patient reports he was having an asthma attack today, used his albuterol, now feels much better, but ran out of albuterol. Felt sob. No fever, cough, cp, ap, n/v/d. Felt like his typical asthma. Patient has hospital bracelets on from another hospital from 11/3 and 10/28. Reports he has been on steroids for 4 days.

## 2021-11-06 NOTE — ED ADULT TRIAGE NOTE - CHIEF COMPLAINT QUOTE
C/o I was having an asthma attack earlier today, I feel better after albuterol. Peak flow 620, no respiratory distress noted

## 2021-11-06 NOTE — ED PROVIDER NOTE - PATIENT PORTAL LINK FT
You can access the FollowMyHealth Patient Portal offered by [facility] by registering at the following website: [email or web address]. By joining [email or web address]’s FollowMyHealth portal, you will also be able to view your health information using other applications (apps) compatible with our system.

## 2021-11-06 NOTE — ED PROVIDER NOTE - CLINICAL SUMMARY MEDICAL DECISION MAKING FREE TEXT BOX
Patient reports asthma exacerbation but now asymptomatic in ED. I told patient he is likely overusing albuterol and does not need extension on steroids as peak flow is normal. Return to the ED immediately if getting worse, not improving, or if having any new or troubling symptoms.

## 2021-11-17 ENCOUNTER — EMERGENCY (EMERGENCY)
Facility: HOSPITAL | Age: 31
LOS: 1 days | Discharge: HOME | End: 2021-11-17
Attending: EMERGENCY MEDICINE
Payer: MEDICAID

## 2021-11-17 VITALS
TEMPERATURE: 98 F | RESPIRATION RATE: 16 BRPM | SYSTOLIC BLOOD PRESSURE: 129 MMHG | HEART RATE: 73 BPM | OXYGEN SATURATION: 97 % | DIASTOLIC BLOOD PRESSURE: 84 MMHG

## 2021-11-17 VITALS
TEMPERATURE: 98 F | RESPIRATION RATE: 17 BRPM | WEIGHT: 130.07 LBS | OXYGEN SATURATION: 99 % | HEART RATE: 81 BPM | HEIGHT: 67 IN | DIASTOLIC BLOOD PRESSURE: 78 MMHG | SYSTOLIC BLOOD PRESSURE: 120 MMHG

## 2021-11-17 PROCEDURE — 94640 AIRWAY INHALATION TREATMENT: CPT

## 2021-11-17 PROCEDURE — 99284 EMERGENCY DEPT VISIT MOD MDM: CPT | Mod: 25

## 2021-11-17 PROCEDURE — 99284 EMERGENCY DEPT VISIT MOD MDM: CPT

## 2021-11-17 RX ORDER — IPRATROPIUM/ALBUTEROL SULFATE 18-103MCG
3 AEROSOL WITH ADAPTER (GRAM) INHALATION ONCE
Refills: 0 | Status: COMPLETED | OUTPATIENT
Start: 2021-11-17 | End: 2021-11-17

## 2021-11-17 RX ORDER — ALBUTEROL 90 UG/1
2 AEROSOL, METERED ORAL
Qty: 240 | Refills: 0
Start: 2021-11-17 | End: 2021-12-16

## 2021-11-17 RX ORDER — ALBUTEROL 90 UG/1
1 AEROSOL, METERED ORAL ONCE
Refills: 0 | Status: COMPLETED | OUTPATIENT
Start: 2021-11-17 | End: 2021-11-17

## 2021-11-17 RX ADMIN — ALBUTEROL 1 PUFF(S): 90 AEROSOL, METERED ORAL at 10:17

## 2021-11-17 RX ADMIN — Medication 3 MILLILITER(S): at 09:25

## 2021-11-17 NOTE — ED PROVIDER NOTE - NS ED ROS FT
CONSTITUTIONAL: no fever, no chills   EYES: no visual changes, no eye pain   ENMT: no nasal congestion, no throat pain  CARDIOVASCULAR: no chest pain, no edema, no palpitations   RESPIRATORY: no shortness of breath, no cough, +wheezing    GASTROINTESTINAL: no abdominal pain, no nausea, no vomiting, no diarrhea, no constipation   GENITOURINARY: no dysuria, no frequency  MUSCULOSKELETAL: no joint pains, no myalgias, no back pain   SKIN: no rashes  NEUROLOGICAL: no weakness, no headache, no dizziness, no slurred speech, no syncope   PSYCHIATRIC: no known mental health illness   HEME/LYMPH: no lymphadenopathy      All other ROS negative except as per HPI

## 2021-11-17 NOTE — ED PROVIDER NOTE - PHYSICAL EXAMINATION
GENERAL: well appearing, no acute distress   HEAD: atraumatic   EYES: EOMI, pink conjunctiva   ENT: moist oral mucosa   CARDIAC: RRR, no edema, distal pulses present   RESPIRATORY: scant expiratory wheezing, no increased work of breathing   GASTROINTESTINAL: no abdominal tenderness, no rebound or guarding, bowel sounds presents  MUSCULOSKELETAL: no deformity   NEUROLOGICAL: AAOx3, spontaneous movement of extremities   SKIN: intact   PSYCHIATRIC: cooperative  HEME LYMPH: no lymphadenopathy

## 2021-11-17 NOTE — ED PROVIDER NOTE - NSFOLLOWUPINSTRUCTIONS_ED_ALL_ED_FT
WHEEZING - [facility](R) Instructions(ER/ED)           Wheezing    WHAT YOU NEED TO KNOW:    Wheezing happens when air flows through a narrowed or blocked airway. Wheezing can happen when you breathe in, breathe out, or both. Wheezes may sound like a whistle, squeal, groan, or creak. Wheezes may also sound musical or high-pitched.    DISCHARGE INSTRUCTIONS:    Call your local emergency number (911 in the [place]) if:   •You feel lightheaded, short of breath, and have chest pain.      •You are dizzy, confused, or feel faint.      •You have sudden trouble breathing.      •Your throat feels like it is swelling or feels tight.      Return to the emergency department if:   •You cough up blood.          Call your doctor if:   •You have a fever.      •Your wheezing does not get better or it gets worse.      •You have questions or concerns about your condition or care.      Medicines:   •Medicines may help open your airways, decrease your symptoms, or treat an infection. They may be given as an inhaler, nebulizer, or pill.      •Take your medicine as directed. Contact your healthcare provider if you think your medicine is not helping or if you have side effects. Tell him or her if you are allergic to any medicine. Keep a list of the medicines, vitamins, and herbs you take. Include the amounts, and when and why you take them. Bring the list or the pill bottles to follow-up visits. Carry your medicine list with you in case of an emergency.      Self care:   •Return to your usual activity as directed. You may need to limit certain activities. Ask your healthcare provider when it is okay to resume activity.      •Take deep breaths and cough several times a day. This will decrease your risk for a lung infection and help decrease wheezing. Take a deep breath and hold it for as long as you can. Let the air out and then cough strongly. Deep breaths help open your airway. You may be given an incentive spirometer to help you take deep breaths. Put the plastic piece in your mouth and take a slow, deep breath in, then let the air out and cough. Repeat these steps 10 times every hour.       •Drink liquids as directed. You may need to drink more liquids than usual to thin your mucus and prevent dehydration. Ask how much liquid to drink each day and which liquids are best for you.       Prevent wheezing:   •Do not smoke. Nicotine and other chemicals in cigarettes and cigars can cause lung damage. Ask your healthcare provider for information if you currently smoke and need help to quit. E-cigarettes or smokeless tobacco still contain nicotine. Talk to your healthcare provider before you use these products.      •Avoid allergy triggers, such as animals, grass, pollen, or dust.      Follow up with your doctor as directed: You may be referred to a specialist. Write down your questions so you remember to ask them during your visits.        © Copyright [employer] 2021           back to top                          © Copyright [employer] 2021

## 2021-11-17 NOTE — ED PROVIDER NOTE - PATIENT PORTAL LINK FT
You can access the FollowMyHealth Patient Portal offered by [facility] by registering at the following website: [email or web address]. By joining [email or web address]’s FollowMyHealth portal, you will also be able to view your health information using other applications (apps) compatible with our system.

## 2021-11-17 NOTE — ED ADULT NURSE NOTE - NSIMPLEMENTINTERV_GEN_ALL_ED
Implemented All Universal Safety Interventions:  [place] to call system. Call bell, personal items and telephone within reach. Instruct patient to call for assistance. Room bathroom lighting operational. Non-slip footwear when patient is off stretcher. Physically safe environment: no spills, clutter or unnecessary equipment. Stretcher in lowest position, wheels locked, appropriate side rails in place.

## 2021-11-17 NOTE — ED ADULT NURSE NOTE - OBJECTIVE STATEMENT
Pt. c/o asthma that started today with shortness of breath and wheezing. Pt. is speaking in complete sentences without difficulty.

## 2021-12-26 ENCOUNTER — EMERGENCY (EMERGENCY)
Facility: HOSPITAL | Age: 31
LOS: 1 days | Discharge: HOME | End: 2021-12-26
Attending: EMERGENCY MEDICINE
Payer: MEDICAID

## 2021-12-26 VITALS
DIASTOLIC BLOOD PRESSURE: 75 MMHG | HEART RATE: 83 BPM | RESPIRATION RATE: 20 BRPM | HEIGHT: 67 IN | TEMPERATURE: 98 F | SYSTOLIC BLOOD PRESSURE: 142 MMHG | WEIGHT: 169.98 LBS | OXYGEN SATURATION: 99 %

## 2021-12-26 PROCEDURE — 99284 EMERGENCY DEPT VISIT MOD MDM: CPT

## 2021-12-26 PROCEDURE — 99283 EMERGENCY DEPT VISIT LOW MDM: CPT

## 2021-12-26 RX ORDER — ALBUTEROL 90 UG/1
2 AEROSOL, METERED ORAL
Qty: 1 | Refills: 0
Start: 2021-12-26

## 2021-12-26 RX ORDER — ALBUTEROL 90 UG/1
2 AEROSOL, METERED ORAL EVERY 6 HOURS
Refills: 0 | Status: DISCONTINUED | OUTPATIENT
Start: 2021-12-26 | End: 2021-12-30

## 2021-12-26 NOTE — ED ADULT TRIAGE NOTE - WEIGHT IN KG
Patient Seen in: [facility] Emergency Department In [place]    History   Patient presents with:   Eye Visual Problem (opthalmic)    Stated Complaint: left eye is \"pink\" denies drainage    1year-old  male without significant past medical history exudate. Left eye exhibits no chemosis, no discharge, no exudate, no edema, no stye, no erythema and no tenderness. No foreign body present in the left eye. Right conjunctiva is not injected. Right conjunctiva has no hemorrhage.  Left conjunctiva is injecte 77.1

## 2021-12-26 NOTE — ED PROVIDER NOTE - PATIENT PORTAL LINK FT
You can access the FollowMyHealth Patient Portal offered by [facility] by registering at the following website: [email or web address]. By joining [employer]’s FollowMyHealth portal, you will also be able to view your health information using other applications (apps) compatible with our system.

## 2021-12-26 NOTE — ED ADULT NURSE NOTE - OBJECTIVE STATEMENT
Wheezing and SOB 30 mins PTA. Pt talking in full sentences no distress noted. Requesting asthma pump refill.

## 2021-12-26 NOTE — ED ADULT NURSE NOTE - NSFALLRSKASSESSTYPE_ED_ALL_ED
7/29/2021  IKirstin MD, saw and evaluated the patient  I have discussed the patient with the resident/non-physician practitioner and agree with the resident's/non-physician practitioner's findings, Plan of Care, and MDM as documented in the resident's/non-physician practitioner's note, except where noted  All available labs and Radiology studies were reviewed  I was present for key portions of any procedure(s) performed by the resident/non-physician practitioner and I was immediately available to provide assistance  At this point I agree with the current assessment done in the Emergency Department  I have conducted an independent evaluation of this patient a history and physical is as follows:    ED Course     66-year-old female presenting to the emergency department for evaluation of multiple complaints  Patient states for the past 2 days, she has been experiencing gradual onset headache, generalized nausea, bloody bowel movements where there was blood mixed with stool, and exertional chest pressure  Patient denies current chest pain or pressure  No cough  No fever  No vomiting  No new rashes  No new leg pain or swelling  Ten systems reviewed and negative except as noted in the history of present illness  The patient is resting comfortably on a stretcher in no acute respiratory distress  The patient appears nontoxic  HEENT reveals moist mucous membranes  Head is normocephalic and atraumatic  Conjunctiva and sclera are normal  Neck is nontender and supple with full range of motion to flexion, extension, lateral rotation  No meningismus appreciated  No masses are appreciated  Lungs are clear to auscultation bilaterally without any wheezes, rales or rhonchi  Heart is regular rate and rhythm without any murmurs, rubs or gallops  Abdomen is soft and nontender without any rebound or guarding  Extremities appear grossly normal without any significant arthropathy   Patient is awake, alert, and oriented x3  The patient has normal interaction  Motor is 5 out of 5           27-year-old female presenting to the emergency department for evaluation of multiple complaints  Most concerning is exertional chest discomfort  Labs Reviewed   COMPREHENSIVE METABOLIC PANEL - Abnormal       Result Value Ref Range Status    Sodium 138  136 - 145 mmol/L Final    Potassium 3 2 (*) 3 5 - 5 3 mmol/L Final    Comment: Slightly Hemolyzed; Results May be Affected    Chloride 103  100 - 108 mmol/L Final    CO2 25  21 - 32 mmol/L Final    ANION GAP 10  4 - 13 mmol/L Final    BUN 9  5 - 25 mg/dL Final    Creatinine 0 83  0 60 - 1 30 mg/dL Final    Comment: Standardized to IDMS reference method    Glucose 102  65 - 140 mg/dL Final    Comment: If the patient is fasting, the ADA then defines impaired fasting glucose as > 100 mg/dL and diabetes as > or equal to 123 mg/dL  Specimen collection should occur prior to Sulfasalazine administration due to the potential for falsely depressed results  Specimen collection should occur prior to Sulfapyridine administration due to the potential for falsely elevated results  Calcium 8 8  8 3 - 10 1 mg/dL Final    AST 45  5 - 45 U/L Final    Comment: Slightly Hemolyzed; Results May be Affected  Specimen collection should occur prior to Sulfasalazine administration due to the potential for falsely depressed results  ALT 78  12 - 78 U/L Final    Comment: Specimen collection should occur prior to Sulfasalazine and/or Sulfapyridine administration due to the potential for falsely depressed results  Alkaline Phosphatase 77  46 - 116 U/L Final    Total Protein 8 1  6 4 - 8 2 g/dL Final    Albumin 3 8  3 5 - 5 0 g/dL Final    Total Bilirubin 0 56  0 20 - 1 00 mg/dL Final    Comment: Use of this assay is not recommended for patients undergoing treatment with eltrombopag due to the potential for falsely elevated results      eGFR 87  ml/min/1 73sq m Final    Narrative:     National Kidney Disease Foundation guidelines for Chronic Kidney Disease (CKD):     Stage 1 with normal or high GFR (GFR > 90 mL/min/1 73 square meters)    Stage 2 Mild CKD (GFR = 60-89 mL/min/1 73 square meters)    Stage 3A Moderate CKD (GFR = 45-59 mL/min/1 73 square meters)    Stage 3B Moderate CKD (GFR = 30-44 mL/min/1 73 square meters)    Stage 4 Severe CKD (GFR = 15-29 mL/min/1 73 square meters)    Stage 5 End Stage CKD (GFR <15 mL/min/1 73 square meters)  Note: GFR calculation is accurate only with a steady state creatinine   TSH, 3RD GENERATION WITH FREE T4 REFLEX - Abnormal    TSH 3RD GENERATON 0 202 (*) 0 358 - 3 740 uIU/mL Final    Comment: The recommended reference ranges for TSH during pregnancy are as follows:   First trimester 0 1 to 2 5 uIU/mL   Second trimester  0 2 to 3 0 uIU/mL   Third trimester 0 3 to 3 0 uIU/m    Note: Normal ranges may not apply to patients who are transgender, non-binary, or whose legal sex, sex at birth, and gender identity differ  Narrative:     Patients undergoing fluorescein dye angiography may retain small amounts of fluorescein in the body for 48-72 hours post procedure  Samples containing fluorescein can produce falsely depressed TSH values  If the patient had this procedure,a specimen should be resubmitted post fluorescein clearance  T4, FREE - Abnormal    Free T4 1 80 (*) 0 76 - 1 46 ng/dL Final    Comment: Specimen collection should occur prior to Sulfasalazine administration due to the potential for falsely elevated results     URINE MACROSCOPIC, POC - Abnormal    Color, UA Yellow   Final    Clarity, UA Clear   Final    pH, UA 7 0  4 5 - 8 0 Final    Leukocytes, UA Negative  Negative Final    Nitrite, UA Negative  Negative Final    Protein, UA Negative  Negative mg/dl Final    Glucose, UA Negative  Negative mg/dl Final    Ketones, UA Negative  Negative mg/dl Final    Urobilinogen, UA 0 2  0 2, 1 0 E U /dl E U /dl Final    Bilirubin, UA Negative  Negative Final    Blood, UA Trace (*) Negative Final    Specific [place], UA 1 010  1 003 - 1 030 Final    Narrative:     CLINITEK RESULT   TROPONIN I - Normal    Troponin I <0 02  <=0 04 ng/mL Final    Comment: Siemens Chemistry analyzer 99% cutoff is > 0 04 ng/mL in network labs     o cTnI 99% cutoff is useful only when applied to patients in the clinical setting of myocardial ischemia   o cTnI 99% cutoff should be interpreted in the context of clinical history, ECG findings and possibly cardiac imaging to establish correct diagnosis  o cTnI 99% cutoff may be suggestive but clearly not indicative of a coronary event without the clinical setting of myocardial ischemia       URINE MICROSCOPIC - Normal    RBC, UA 2-4  None Seen, 2-4 /hpf Final    WBC, UA 2-4  None Seen, 2-4, 5-60 /hpf Final    Epithelial Cells Occasional  None Seen, Occasional /hpf Final    Bacteria, UA None Seen  None Seen, Occasional /hpf Final    Hyaline Casts, UA None Seen  None Seen /lpf Final   MAGNESIUM - Normal    Magnesium 2 1  1 6 - 2 6 mg/dL Final   NOVEL CORONAVIRUS (COVID-19), PCR [facility]   CBC AND DIFFERENTIAL    WBC 9 23  4 31 - 10 16 Thousand/uL Final    RBC 4 95  3 81 - 5 12 Million/uL Final    Hemoglobin 14 7  11 5 - 15 4 g/dL Final    Hematocrit 43 7  34 8 - 46 1 % Final    MCV 88  82 - 98 fL Final    MCH 29 7  26 8 - 34 3 pg Final    MCHC 33 6  31 4 - 37 4 g/dL Final    RDW 13 2  11 6 - 15 1 % Final    MPV 11 6  8 9 - 12 7 fL Final    Platelets 757  427 - 390 Thousands/uL Final    nRBC 0  /100 WBCs Final    Neutrophils Relative 60  43 - 75 % Final    Immat GRANS % 0  0 - 2 % Final    Lymphocytes Relative 26  14 - 44 % Final    Monocytes Relative 10  4 - 12 % Final    Eosinophils Relative 3  0 - 6 % Final    Basophils Relative 1  0 - 1 % Final    Neutrophils Absolute 5 61  1 85 - 7 62 Thousands/µL Final    Immature Grans Absolute 0 04  0 00 - 0 20 Thousand/uL Final    Lymphocytes Absolute 2 35  0 60 - 4 47 Thousands/µL Final    Monocytes Absolute 0 89  0 17 - 1 22 Thousand/µL Final    Eosinophils Absolute 0 26  0 00 - 0 61 Thousand/µL Final    Basophils Absolute 0 08  0 00 - 0 10 Thousands/µL Final   LIGHT BLUE TOP       XR chest 1 view portable   ED Interpretation   No effusion, no focal infiltrate          Patient with EKG changes of prolonged QT as well as T-wave inversion and mild ST depression which is new from previous      Critical Care Time  Procedures Initial (On Arrival)

## 2021-12-26 NOTE — ED ADULT TRIAGE NOTE - CHIEF COMPLAINT QUOTE
Wheezing and SOB 30 mins PTA. Pt talking in full sentences no distress noted. Requesting asthma pump refill. Reports smoking marihuana prior to asthma attack.,

## 2021-12-26 NOTE — ED PROVIDER NOTE - NSFOLLOWUPINSTRUCTIONS_ED_ALL_ED_FT
Return to ER immediately if you feel short of breath, have chest pain, fever, coughing worsens, vomiting, weakness any concerns. Take medications as instructed if they were prescribed.  See your primary doctor as soon as possible (1-2 days). If you need assistance with follow up appointment, you can contact our Care Coordinator at [phone].

## 2021-12-26 NOTE — ED PROVIDER NOTE - CLINICAL SUMMARY MEDICAL DECISION MAKING FREE TEXT BOX
Pt is well appearing, no respiratory distress, not hypoxic,  has no new complaints and able to walk with normal gait. Pt is stable for discharge and follow up with medical doctor. Pt educated on care and need for follow up. Discussed anticipatory guidance and return precautions. Questions answered. I had a detailed discussion with the patient and/or guardian regarding the historical points, exam findings, and any diagnostic results supporting the discharge diagnosis.

## 2021-12-26 NOTE — ED PROVIDER NOTE - OBJECTIVE STATEMENT
Pt requesting refill for ventolin. No other complaints. Denies chest pain, shortness of breath, fever on my evaluation.  No hx of intubations, no recent hospitalizations.

## 2022-01-01 NOTE — ED ADULT NURSE NOTE - NS ED NURSE LEVEL OF CONSCIOUSNESS AFFECT
Occupational Therapy   Nippling Progress Note    Raúl Arnold   MRN: [id number]     Recommendations: nipple pt per IDF protocol, head zflo   Nipple: Nfant Purple  Interventions: nipple pt in sidelying position, pacing techniques as needed  Frequency: Continue OT a minimum of 5 x/week    Patient Active Problem List   Diagnosis      infant with birth weight of 1,500 to 1,749 grams and 33 completed weeks of gestation    Healthcare maintenance    PVC (premature ventricular contraction)    Other feeding problems of      Precautions: standard,      Subjective   RN reports that patient is appropriate for OT to see for nippling.    Objective   Patient found with: telemetry, pulse ox (continuous), NG tube; swaddled supine within open air crib, RN present completing assessment & cares .    Pain Assessment:  Crying:  none   HR: WDL  RR:  tachypnea at end of feeding   O2 Sats: WDL  Expression:  neutral, furrowed brow     No apparent pain noted throughout session    Eye openin% of session   States of alertness: quiet alert   Stress signs: brow furrow, tachypnea, loss of organization     Treatment: Provided positive static touch for containment to promote calming and organization prior to handling. Pt transitioned into Ots lap with delayed onset of rooting effort, held upright with active scanning of environment, brief attention to caregiver sustained ~3-5 seconds at a time. Pt rooted to bottle and transitioned into elevated sidelying for nippling with pacing provided per cues. Pt taking suck bursts of 3-5 sucks with sustained alertness throughout feeding, fatigue noted with progression with increased rest breaks. Pt with brief tachypnea with loss of organization upon completion of volume. Burp breaks provided with 3 burps elicited in total. Pt held in modified prone on Ots chest following feeding to promote positive association and aide in digestion.     Pt repositioned swaddled supine on head zflo  "within open air crib  with all lines intact.    Nipple:Nfant purple   Seal:  fairly good   Latch: fairly good    Suction:  fairly good   Coordination:  fair   Intake: 38/38 ml in 15"    Vitals:  tachypnea at end of feeding   Overall performance:  fair    No family present for education.     Assessment   Summary/Analysis of evaluation: pt with sustained alertness throughout feeding, delay in rooting effort with active scanning and sustained attention to caregiver with good visual skills emerging. Pt demo improved coordination with rhythmical sucking bursts with quick onset of loss of organization with tachypnea at end of feeding. Recommend Nfant purple slow flow nipple in elevated sidelying with pacing per cues.      Progress toward previous goals: Continue goals/progressing  Multidisciplinary Problems       Occupational Therapy Goals          Problem: Occupational Therapy    Goal Priority Disciplines Outcome Interventions   Occupational Therapy Goal     OT, PT/OT Ongoing, Progressing    Description: Goals to be met by: 2022    Pt to be properly positioned 100% of time by family & staff  Pt will remain in quiet organized state for 100% of session  Pt will tolerate tactile stimulation with <50% signs of stress during 3 consecutive sessions  Pt eyes will remain open for 50% of session  Parents will demonstrate dev handling caregiving techniques while pt is calm & organized  Pt will tolerate prom to all 4 extremities with no tightness noted  Pt will bring hands to mouth & midline 5-7 times per session  Pt will maintain eye contact for 3-5 seconds for 3 trials in a session  Pt will suck pacifier with fair suck & latch in prep for oral fdg  Pt will maintain head in midline with fair head control 3 times during session  Pt will nipple 100% of feeds with good suck & coordination    Pt will nipple with 100% of feeds with good latch & seal  Family will independently nipple pt with oral stimulation as needed  Family will " be independent with hep for development stimulation                           Patient would benefit from continued OT for nippling, oral/developmental stimulation and family training.    Plan   Continue OT a minimum of 5 x/week to address nippling, oral/dev stimulation, positioning, family training, PROM.    Plan of Care Expires: 01/23/23    OT Date of Treatment: 11/03/22   OT Start Time: 0900  OT Stop Time: 0926  OT Total Time (min): 26 min    Billable Minutes:  Self Care/Home Management 26     Calm

## 2022-03-01 ENCOUNTER — EMERGENCY (EMERGENCY)
Facility: HOSPITAL | Age: 32
LOS: 1 days | Discharge: HOME | End: 2022-03-01
Attending: EMERGENCY MEDICINE
Payer: MEDICAID

## 2022-03-01 VITALS
TEMPERATURE: 98 F | DIASTOLIC BLOOD PRESSURE: 65 MMHG | HEIGHT: 67 IN | RESPIRATION RATE: 20 BRPM | HEART RATE: 70 BPM | SYSTOLIC BLOOD PRESSURE: 114 MMHG | WEIGHT: 130.07 LBS | OXYGEN SATURATION: 98 %

## 2022-03-01 PROCEDURE — 99284 EMERGENCY DEPT VISIT MOD MDM: CPT

## 2022-03-01 PROCEDURE — 94640 AIRWAY INHALATION TREATMENT: CPT

## 2022-03-01 PROCEDURE — 99285 EMERGENCY DEPT VISIT HI MDM: CPT | Mod: 25

## 2022-03-01 RX ORDER — IPRATROPIUM/ALBUTEROL SULFATE 18-103MCG
3 AEROSOL WITH ADAPTER (GRAM) INHALATION ONCE
Refills: 0 | Status: COMPLETED | OUTPATIENT
Start: 2022-03-01 | End: 2022-03-01

## 2022-03-01 RX ORDER — ALBUTEROL 90 UG/1
2 AEROSOL, METERED ORAL
Qty: 1 | Refills: 0
Start: 2022-03-01

## 2022-03-01 RX ADMIN — Medication 3 MILLILITER(S): at 18:27

## 2022-03-01 RX ADMIN — Medication 50 MILLIGRAM(S): at 18:28

## 2022-03-01 NOTE — ED ADULT NURSE NOTE - OBJECTIVE STATEMENT
Pt c/o asthma attack x 3 days. Pt states he rant out of his inhaler. No acute distress noted, pt denies chest pain, no shortness of breath indicated.

## 2022-03-01 NOTE — ED PROVIDER NOTE - OBJECTIVE STATEMENT
31 y.o male with a PMHx of asthma and has never been intubated is coming in with an asthma attack. Patient states he ran out of his inhaler at home and states that is wheezing was probably triggered by the weather. Patient denies smoking, drinking, and drug use. Patient is speaking in full sentences.

## 2022-03-01 NOTE — ED PROVIDER NOTE - NSFOLLOWUPINSTRUCTIONS_ED_ALL_ED_FT
Asthma    WHAT YOU NEED TO KNOW:    Asthma is a lung disease that makes breathing difficult. Chronic inflammation and reactions to triggers narrow the airways in the lungs. Asthma can become life-threatening if it is not managed.    Normal vs Asthmatic Bronchioles Adult         DISCHARGE INSTRUCTIONS:    Call your local emergency number (911 in the [place]) if:   •You have severe shortness of breath.      •The skin around your neck and ribs pulls in with each breath.      •Your peak flow numbers are in the red zone of your AAP.      Seek care immediately if:   •You have shortness of breath, even after you take your short-term medicine as directed.      •Your lips or nails turn blue or gray.      Call your doctor or asthma specialist if:   •You run out of medicine before your next refill is due.      •Your symptoms get worse.      •You need to take more medicine than usual to control your symptoms.      •You have questions or concerns about your condition or care.      Medicines:   •Medicines may be used to decrease inflammation, open airways, and make it easier to breathe. Medicines may be inhaled, taken as a pill, or injected. Short-term medicines relieve your symptoms quickly. Long-term medicines are used to prevent future asthma attacks. Other medicines may be needed if your regular medicines are not able to prevent attacks. You may also need medicine to help control your allergies. Ask your healthcare provider for more information about any medicine you are given and how to take it safely.      •Take your medicine as directed. Contact your healthcare provider if you think your medicine is not helping or if you have side effects. Tell him or her if you are allergic to any medicine. Keep a list of the medicines, vitamins, and herbs you take. Include the amounts, and when and why you take them. Bring the list or the pill bottles to follow-up visits. Carry your medicine list with you in case of an emergency.      Manage your symptoms and prevent future attacks:   •Follow your Asthma Action Plan (AAP). This is a written plan that you and your healthcare provider create. It explains which medicine you need and when to change doses if necessary. It also explains how you can monitor symptoms and use a peak flow meter. The meter measures how well your lungs are working.      •Manage other health conditions, such as allergies, acid reflux, and sleep apnea.      •Identify and avoid triggers. These may include pets, dust mites, mold, and cockroaches.      •Do not smoke or be around others who smoke. Nicotine and other chemicals in cigarettes and cigars can cause lung damage. Ask your healthcare provider for information if you currently smoke and need help to quit. E-cigarettes or smokeless tobacco still contain nicotine. Talk to your healthcare provider before you use these products.      •Ask about the flu vaccine. The flu can make your asthma worse. You may need a yearly flu shot.      Follow up with your healthcare provider as directed: You will need to return to make sure your medicine is working and your symptoms are controlled. You may be referred to an asthma or allergy specialist. You may be asked to keep a record of your peak flow values and bring it with you to your appointments. Write down your questions so you remember to ask them during your visits.       © Copyright [employer] 2022           back to top                          © Copyright [employer] 2022

## 2022-03-01 NOTE — ED PROVIDER NOTE - PATIENT PORTAL LINK FT
You can access the FollowMyHealth Patient Portal offered by [facility] by registering at the following website: [email or web address]. By joining [facility]’s FollowMyHealth portal, you will also be able to view your health information using other applications (apps) compatible with our system.

## 2022-03-29 ENCOUNTER — EMERGENCY (EMERGENCY)
Facility: HOSPITAL | Age: 32
LOS: 1 days | Discharge: LEFT BEFORE DISCHARGE | End: 2022-03-29
Attending: EMERGENCY MEDICINE
Payer: SELF-PAY

## 2022-03-29 VITALS
OXYGEN SATURATION: 98 % | HEART RATE: 63 BPM | DIASTOLIC BLOOD PRESSURE: 69 MMHG | WEIGHT: 134.92 LBS | RESPIRATION RATE: 18 BRPM | SYSTOLIC BLOOD PRESSURE: 109 MMHG | TEMPERATURE: 98 F | HEIGHT: 67 IN

## 2022-03-29 PROCEDURE — L9991: CPT

## 2022-03-29 RX ORDER — IPRATROPIUM/ALBUTEROL SULFATE 18-103MCG
3 AEROSOL WITH ADAPTER (GRAM) INHALATION ONCE
Refills: 0 | Status: DISCONTINUED | OUTPATIENT
Start: 2022-03-29 | End: 2022-04-02

## 2022-03-29 NOTE — ED ADULT NURSE NOTE - OBJECTIVE STATEMENT
Observed pt sitting in bed using cellphone, no respiratory distress noted, pt left before he spoke with his nurse or was seen by MD.

## 2022-04-07 ENCOUNTER — EMERGENCY (EMERGENCY)
Facility: HOSPITAL | Age: 32
LOS: 1 days | Discharge: HOME | End: 2022-04-07
Attending: EMERGENCY MEDICINE
Payer: MEDICAID

## 2022-04-07 VITALS
SYSTOLIC BLOOD PRESSURE: 112 MMHG | OXYGEN SATURATION: 98 % | RESPIRATION RATE: 18 BRPM | DIASTOLIC BLOOD PRESSURE: 69 MMHG | HEART RATE: 71 BPM | HEIGHT: 67 IN | TEMPERATURE: 98 F

## 2022-04-07 PROCEDURE — 99284 EMERGENCY DEPT VISIT MOD MDM: CPT

## 2022-04-07 PROCEDURE — 94640 AIRWAY INHALATION TREATMENT: CPT

## 2022-04-07 PROCEDURE — 99285 EMERGENCY DEPT VISIT HI MDM: CPT | Mod: 25

## 2022-04-07 RX ORDER — IPRATROPIUM/ALBUTEROL SULFATE 18-103MCG
3 AEROSOL WITH ADAPTER (GRAM) INHALATION ONCE
Refills: 0 | Status: COMPLETED | OUTPATIENT
Start: 2022-04-07 | End: 2022-04-07

## 2022-04-07 RX ORDER — ALBUTEROL 90 UG/1
2 AEROSOL, METERED ORAL
Qty: 1 | Refills: 0
Start: 2022-04-07

## 2022-04-07 RX ORDER — ALBUTEROL 90 UG/1
1 AEROSOL, METERED ORAL ONCE
Refills: 0 | Status: COMPLETED | OUTPATIENT
Start: 2022-04-07 | End: 2022-04-07

## 2022-04-07 RX ADMIN — Medication 3 MILLILITER(S): at 11:25

## 2022-04-07 RX ADMIN — Medication 50 MILLIGRAM(S): at 11:40

## 2022-04-07 RX ADMIN — Medication 3 MILLILITER(S): at 11:35

## 2022-04-07 RX ADMIN — Medication 3 MILLILITER(S): at 11:55

## 2022-04-07 RX ADMIN — ALBUTEROL 1 PUFF(S): 90 AEROSOL, METERED ORAL at 14:50

## 2022-04-07 NOTE — ED PROVIDER NOTE - PATIENT PORTAL LINK FT
You can access the FollowMyHealth Patient Portal offered by Health system by registering at the following website: [email or web address]. By joining [facility]’s FollowMyHealth portal, you will also be able to view your health information using other applications (apps) compatible with our system.

## 2022-04-07 NOTE — ED ADULT NURSE NOTE - NSIMPLEMENTINTERV_GEN_ALL_ED
Implemented All Universal Safety Interventions:  [place] to call system. Call bell, personal items and telephone within reach. Instruct patient to call for assistance. Room bathroom lighting operational. Non-slip footwear when patient is off stretcher. Physically safe environment: no spills, clutter or unnecessary equipment. Stretcher in lowest position, wheels locked, appropriate side rails in place.

## 2022-04-07 NOTE — ED ADULT NURSE NOTE - OBJECTIVE STATEMENT
Patient awake, alert, calm, cooperative, reports being asthmatic and his asthma is "acting up". Patient in no acute distress, no shortness of breath or difficulty breathing noted at this time.

## 2022-04-07 NOTE — ED ADULT NURSE NOTE - DRUG PRE-SCREENING (DAST -1)
Chief Complaint   Patient presents with   • Physical     no concerns       HISTORY    The patient is a 30 year old female who comes in for a complete physical exam.  Patient is complaining of depression symptoms, loss of interest and crying spells for the past 2 months. She is also complaining of palpitations and anxiety symptoms. She has history of using clonazepam many months ago.    Health care maintenance issues were reviewed and updated.  Needed tests/procedures are as per orders.    MEDICATIONS  Current Outpatient Prescriptions   Medication Sig Dispense Refill   • DULoxetine (CYMBALTA) 20 MG capsule Take 1 capsule by mouth daily. 30 capsule 3     No current facility-administered medications for this visit.        ALLERGIES  ALLERGIES:   Allergen Reactions   • Latex SWELLING     \"red bumps on skin with condom use\"       HISTORIES  Past Medical History:   Diagnosis Date   • Fracture     right ankle   • PONV (postoperative nausea and vomiting)    • Stye     RIGHT EYE, gets every now and then   • Tattoo        Past Surgical History:   Procedure Laterality Date   •  section, low transverse  ' 06 and' 10    Failure to progress and repeat   • Tubal ligation      Complete tubal cauterization          Family History   Problem Relation Age of Onset   • Kidney disease Father         polycystic kidney disease, kidney transplant   • Hypertension Father    • Diabetes Father        Social History     Occupational History   • Not on file.     Social History Main Topics   • Smoking status: Never Smoker   • Smokeless tobacco: Never Used   • Alcohol use No   • Drug use: No   • Sexual activity: Yes     Partners: Male     Birth control/ protection: Surgical      Comment: compleat tubal cauterization       REVIEW OF SYSTEMS    Constitutional: Denies weight loss, generalized fatigue, chills, night sweats  Eyes:  Denies change in visual acuity, denies diplopia, denies photophobia   HENT: Denies hearing loss, tinnitus,  chronic nasal congestion, sore throat, change in voice  Respiratory: Denies shortness of breath, chronic cough, sputum production, hemoptysis  Cardiovascular:  Denies chest pain, palpitations, dyspnea on exertion, claudication symptoms  GI:  Denies difficulty swallowing, abdominal pain, diarrhea, constipation, melena, changes in bowel habits  :  Denies dysuria, hematuria, frequency, urinary incontinence, hesitancy, weak stream  Musculoskeletal:  Denies back pain or joint pain   Integument:  Denies rash, changes in moles, no lesions seen  Neurologic:  Denies headache, focal weakness or sensory changes   Endocrine:  Denies polyuria or polydipsia, denies temperature intolerance   Lymphatic:  Denies swollen glands   Psychiatric: anxiety and depression    PHYSICAL EXAM  Vitals:    Visit Vitals  /60   Pulse 72   Resp 15   Ht 5' 9\" (1.753 m)   Wt 93 kg   LMP 07/17/2018   BMI 30.28 kg/m²     General appearance: healthy, alert and in no distress  Skin: Skin color, texture, turgor are normal. There are no bruises, rashes or lesions.  Head: normocephalic. No masses, lesions, tenderness or abnormalities  Eyes: conjuctivae/sclerae normal. No erythema, edema or exudate.  Ears: External ears normal. Canals clear. TM's normal.  Nose/Sinuses: Nares normal. Septum midline. Mucosa normal. No drainage or sinus tenderness.  Oropharynx: Lips, mucosa, and tongue normal. Teeth and gums normal. Oropharynx clear.  Neck: Supple, carotid upstrokes 2+ bilaterally, no bruits, no thyromegaly or nodules, no cervical adenopathy and no supraclavicular adenopathy  Back: negative, Back symmetric, no curvature. ROM normal. No costovertebral angle    tenderness.  Lungs: Percussion normal. Good diaphragmatic excursion. Lungs clear  Heart: negative, PMI normal, regular rate and rhythm, S1 normal, S2 normal, no murmurs, clicks, or gallops   Abdomen: soft, non-tender, bowel sounds normal, no masses or tgknca-[id number] noted  Extremities: Full  range of motion, no edema  Neuro: CN II-XII intact. Sensory and motor grossly intact. Reflexes normal and symmetric  Depressed mood and flat affect    ASSESSMENT/PLAN  Felicita was seen today for physical.    Diagnoses and all orders for this visit:    General medical examination  -     CBC & AUTO DIFFERENTIAL; Future  -     COMPREHENSIVE METABOLIC PANEL; Future  -     LIPID PANEL WITH REFLEX; Future  -     THYROID STIMULATING HORMONE; Future  -     URINALYSIS WITH MICRO & CULTURE IF INDICATED; Future  -     VITAMIN D -25 HYDROXY; Future    Moderate major depression (CMS/HCC) - relaxation exercises  -     SERVICE TO BEHAVIORAL HEALTH  -     DULoxetine (CYMBALTA) 20 MG capsule; Take 1 capsule by mouth daily.    Anxiety, generalized  -     DULoxetine (CYMBALTA) 20 MG capsule; Take 1 capsule by mouth daily.        Return in about 4 weeks (around 8/27/2018), or if symptoms worsen or fail to improve, for anxiety and depression.           Statement Selected

## 2022-04-07 NOTE — ED PROVIDER NOTE - NSFOLLOWUPINSTRUCTIONS_ED_ALL_ED_FT
Asthma Attack       Asthma attack, also called acute bronchospasm, is the sudden narrowing and tightening of the air passages, which limits the amount of oxygen that can get into the lungs. The narrowing is caused by inflammation and tightening of the muscles in the air tubes (bronchi) in the lungs.    Too much mucus is also produced, which narrows the airways more. This can cause trouble breathing, loud breathing (wheezing), and coughing. The goal of treatment is to open the airways in the lungs and reduce inflammation.      What are the causes?    Possible causes or triggers of this condition include:  •Animal dander, dust mites, or cockroaches.      •Mold, pollen from trees or grass, or cold air.      •Air pollutants such as dust, household , aerosol sprays, strong chemicals, strong odors, and smoke of any kind.      •Stress or strong emotions such as crying or laughing hard.      •Exercise or activity that requires a lot of energy.      •Substances in foods and drinks, such as dried fruits and wine, called sulfites.    •Certain medicines or medical conditions such as:  •Aspirin or beta-blockers.      •Infections or inflammatory conditions, such as a flu (influenza), a cold, pneumonia, or inflammation of the nasal membranes (rhinitis).      •Gastroesophageal reflux disease (GERD). GERD is a condition in which stomach acid backs up into your esophagus and spills into your trachea (windpipe), which can irritate your airways.          What are the signs or symptoms?    Symptoms of this condition include:  •Wheezing. This may sound like whistling while breathing. This may only happen at night.      •Excessive coughing. This may only happen at night.      •Chest tightness or pain.      •Shortness of breath.      •Feeling like you cannot get enough air no matter how hard you breathe (air hunger).        How is this diagnosed?    This condition may be diagnosed based on:  •Your medical history.      •Your symptoms.      •A physical exam.    •Tests to check for other causes of your symptoms or other conditions that may have triggered your asthma attack. These tests may include:  •A chest X-ray.      •Blood tests.      •Tests to assess lung function, such as breathing into a device that measures how much air you can inhale and exhale (spirometry).          How is this treated?    Treatment for this condition depends on the severity and cause of your asthma attack.•For mild attacks, you may receive medicines through a hand-held inhaler (metered dose inhaler, or MDI) or through a device that turns liquid medicine into a mist (nebulizer). These medicines include:  •Quick relief or rescue medicines that quickly relax the airways and lungs.      •Long-acting medicines that are used daily to prevent (control) your asthma symptoms.        •For moderate or severe attacks, you may be treated with steroid medicines by mouth or through an IV injection at the hospital.      •For severe attacks, you may need oxygen therapy or a breathing machine (ventilator).      •If your asthma attack was caused by an infection from bacteria, you will be given antibiotic medicines.        Follow these instructions at home:    Medicines   •Take over-the-counter and prescription medicines only as told by your health care provider.   •Keep your medicines up-to-date.       •Make sure you have all of your medicines available at all times.        •If you were prescribed an antibiotic medicine, take it as told by your health care provider. Do not stop taking the antibiotic even if you start to feel better.      •Tell your doctor if you may be pregnant to make sure your asthma medicine is safe to use during pregnancy.        Avoiding triggers      •Keep track of things that trigger your asthma attacks. Avoid exposure to these triggers.      • Do not use any products that contain nicotine or tobacco, such as cigarettes, e-cigarettes, and chewing tobacco. If you need help quitting, ask your health care provider.      •When there is a lot of pollen, air pollution, or humidity, keep windows closed and use an air conditioner or go to places with air conditioning.      Asthma action plan   •Work with your health care provider to make a written plan for managing and treating your asthma attacks (asthma action plan). This plan should include:  •A list of your asthma triggers and how to avoid them.      •A list of symptoms that you may have during an asthma attack.      •Information about which medicine to take, when to take the medicine and how much of the medicine to take.      •Information to help you understand your peak flow measurements.      •Daily actions that you can take to control your asthma symptoms.      •Contact information for your health care providers.        •If you have an asthma attack, act quickly. Follow the emergency steps on your written asthma action plan. This may prevent you from needing to go to the hospital.      General instructions     •Avoid excessive exercise or activity until your asthma attack goes away. Ask your health care provider what activities are safe for you and when you can return to your normal activities.      •Stay up to date on all your vaccines, such as flu and pneumonia vaccines.      •Drink enough fluid to keep your urine pale yellow. Staying hydrated helps keep mucus in your lungs thin so it can be coughed up easily.      • Do not use alcohol until you have recovered.      •Keep all follow-up visits as told by your health care provider. This is important. Asthma requires careful medical care.        Contact a health care provider if:    •You have followed your action plan for 1 hour and your peak flow reading is still at 50–79%. This is in the yellow zone, which means "caution."      •You need to use your quick reliever medicine more frequently than normal.      •Your medicines are causing side effects, such as rash, itching, swelling, or trouble breathing.      •Your symptoms do not improve after 48 hours.      •You cough up mucus that is thicker than usual.      •You have a fever.        Get help right away if:    •Your peak flow reading is less than 50% of your personal best. This is in the red zone, which means "danger."      •You have trouble breathing.      •You develop chest pain or discomfort.      •Your medicines no longer seem to be helping.      •You are coughing up bloody mucus.      •You have a fever and your symptoms suddenly get worse.      •You have trouble swallowing.      •You feel very tired, and breathing becomes tiring.      These symptoms may represent a serious problem that is an emergency. Do not wait to see if the symptoms will go away. Get medical help right away. Call your local emergency services (911 in the [place] Do not drive yourself to the hospital.       Summary    •Asthma attacks are caused by narrowing or tightness in air passages, which causes shortness of breath, coughing, and loud breathing (wheezing).      •Many things can trigger an asthma attack, such as allergens, weather changes, exercise, strong odors, and smoke of any kind.      •If you have an asthma attack, act quickly. Follow the emergency steps on your written asthma action plan.      •Get help right away if you have severe trouble breathing, chest pain, or fever, or if your home medicines are no longer helping with your symptoms.      This information is not intended to replace advice given to you by your health care provider. Make sure you discuss any questions you have with your health care provider.      Document Revised: 12/15/2020 Document Reviewed: 12/15/2020    Elsevier Patient Education © 2022 [employer]

## 2022-04-07 NOTE — ED PROVIDER NOTE - OBJECTIVE STATEMENT
31 year old male with PMHx of asthma (with no history of intubation in the past) presents to the ED with complaints of shortness of breath over the past week which has been worsening today. Patient denies any cough, fevers, chest pain, lower extremity swelling, and all other acute complaints. In the ED, patient is currently noted to be speaking in full sentences. NKDA.

## 2022-04-19 ENCOUNTER — EMERGENCY (EMERGENCY)
Facility: HOSPITAL | Age: 32
LOS: 1 days | Discharge: HOME | End: 2022-04-19
Admitting: EMERGENCY MEDICINE
Payer: SELF-PAY

## 2022-04-19 ENCOUNTER — EMERGENCY (EMERGENCY)
Facility: HOSPITAL | Age: 32
LOS: 1 days | Discharge: LEFT BEFORE DISCHARGE | End: 2022-04-19
Attending: STUDENT IN AN ORGANIZED HEALTH CARE EDUCATION/TRAINING PROGRAM
Payer: MEDICAID

## 2022-04-19 VITALS
HEART RATE: 66 BPM | OXYGEN SATURATION: 97 % | TEMPERATURE: 98 F | SYSTOLIC BLOOD PRESSURE: 117 MMHG | WEIGHT: 130.07 LBS | RESPIRATION RATE: 17 BRPM | DIASTOLIC BLOOD PRESSURE: 72 MMHG | HEIGHT: 67 IN

## 2022-04-19 VITALS
HEART RATE: 74 BPM | SYSTOLIC BLOOD PRESSURE: 136 MMHG | OXYGEN SATURATION: 99 % | WEIGHT: 130.07 LBS | TEMPERATURE: 98 F | HEIGHT: 67 IN | DIASTOLIC BLOOD PRESSURE: 76 MMHG | RESPIRATION RATE: 18 BRPM

## 2022-04-19 DIAGNOSIS — R07.89 OTHER CHEST PAIN: ICD-10-CM

## 2022-04-19 DIAGNOSIS — R06.2 WHEEZING: ICD-10-CM

## 2022-04-19 DIAGNOSIS — R06.02 SHORTNESS OF BREATH: ICD-10-CM

## 2022-04-19 DIAGNOSIS — Z91.013 ALLERGY TO SEAFOOD: ICD-10-CM

## 2022-04-19 DIAGNOSIS — J45.909 UNSPECIFIED ASTHMA, UNCOMPLICATED: ICD-10-CM

## 2022-04-19 DIAGNOSIS — Z53.29 PROCEDURE AND TREATMENT NOT CARRIED OUT BECAUSE OF PATIENT'S DECISION FOR OTHER REASONS: ICD-10-CM

## 2022-04-19 PROCEDURE — 99282 EMERGENCY DEPT VISIT SF MDM: CPT

## 2022-04-19 PROCEDURE — 99284 EMERGENCY DEPT VISIT MOD MDM: CPT

## 2022-04-19 NOTE — ED PROVIDER NOTE - OBJECTIVE STATEMENT
31M presenting with difficulty breathing. reports his asthma has been acting up with the change 31M presenting with difficulty breathing. reports his asthma has been acting up with the change in the weather. reports he last had an inhaler approximately two weeks ago. no fever, chest pain, nausea or vomiting.

## 2022-04-19 NOTE — ED ADULT TRIAGE NOTE - WEIGHT IN LBS
What Type Of Note Output Would You Prefer (Optional)?: Standard Output Hpi Title: Evaluation of Skin Lesions How Severe Are Your Spot(S)?: moderate Have Your Spot(S) Been Treated In The Past?: has not been treated 130

## 2022-04-19 NOTE — ED PROVIDER NOTE - OBJECTIVE STATEMENT
30 y/o m hx asthma (never intubated or hospitalized) presents c/o chest tightness and wheezing today consistent with his asthma.  Pt stating he ran out of his albuterol inhaler.  Denies fever, chills, CP, all other ROS negative.

## 2022-04-19 NOTE — ED PROVIDER NOTE - CLINICAL SUMMARY MEDICAL DECISION MAKING FREE TEXT BOX
32 y/o m hx asthma presents c/o chest tightness, wheezing consistent with his asthma.  No resp distress, not hypoxic or tachypneic.  Pt not wheezing, making loud sounds with his mouth during lung auscultation which he was asked to stop doing and pt got up and walked out of ED, eloped.

## 2022-04-19 NOTE — ED PROVIDER NOTE - CLINICAL SUMMARY MEDICAL DECISION MAKING FREE TEXT BOX
31M presenting with shortness of breath. when asked to take off his coat for the physical exam, the patient suddenly ran out of the ED.

## 2022-04-19 NOTE — ED ADULT TRIAGE NOTE - CHIEF COMPLAINT QUOTE
Pt co "my asthma bothering me" seen at another hospital earlier today. Speaking clearly and coherently. Pt denies SOB. no hx of intubation

## 2022-04-23 ENCOUNTER — EMERGENCY (EMERGENCY)
Facility: HOSPITAL | Age: 32
LOS: 1 days | Discharge: HOME | End: 2022-04-23
Attending: STUDENT IN AN ORGANIZED HEALTH CARE EDUCATION/TRAINING PROGRAM
Payer: MEDICAID

## 2022-04-23 VITALS
DIASTOLIC BLOOD PRESSURE: 68 MMHG | SYSTOLIC BLOOD PRESSURE: 135 MMHG | HEART RATE: 63 BPM | TEMPERATURE: 98 F | OXYGEN SATURATION: 98 % | RESPIRATION RATE: 18 BRPM

## 2022-04-23 VITALS
SYSTOLIC BLOOD PRESSURE: 142 MMHG | DIASTOLIC BLOOD PRESSURE: 67 MMHG | HEART RATE: 58 BPM | RESPIRATION RATE: 16 BRPM | TEMPERATURE: 97 F | HEIGHT: 67 IN | OXYGEN SATURATION: 98 % | WEIGHT: 130.07 LBS

## 2022-04-23 PROCEDURE — 94640 AIRWAY INHALATION TREATMENT: CPT

## 2022-04-23 PROCEDURE — 99283 EMERGENCY DEPT VISIT LOW MDM: CPT

## 2022-04-23 PROCEDURE — 99284 EMERGENCY DEPT VISIT MOD MDM: CPT

## 2022-04-23 RX ORDER — ALBUTEROL 90 UG/1
2 AEROSOL, METERED ORAL ONCE
Refills: 0 | Status: COMPLETED | OUTPATIENT
Start: 2022-04-23 | End: 2022-04-23

## 2022-04-23 RX ORDER — DEXAMETHASONE 0.5 MG/5ML
10 ELIXIR ORAL ONCE
Refills: 0 | Status: COMPLETED | OUTPATIENT
Start: 2022-04-23 | End: 2022-04-23

## 2022-04-23 RX ADMIN — Medication 10 MILLIGRAM(S): at 11:17

## 2022-04-23 RX ADMIN — ALBUTEROL 2 PUFF(S): 90 AEROSOL, METERED ORAL at 11:17

## 2022-04-23 NOTE — ED PROVIDER NOTE - PHYSICAL EXAMINATION
General: well appearing male, no acute distress   HEENT: normocephalic, atraumatic   Respiratory: normal work of breathing, wheezing on exam    Cardiac: regular rate and rhythm   Abdomen: soft, non-tender, no guarding or rebound   MSK: no swelling or tenderness of lower extremities, moving all extremities spontaneously   Skin: warm, dry   Neuro: A&Ox3  Psych: appropriate affect

## 2022-04-23 NOTE — ED PROVIDER NOTE - PATIENT PORTAL LINK FT
You can access the FollowMyHealth Patient Portal offered by [facility] by registering at the following website: [email or web address]. By joining [facility]’s FollowMyHealth portal, you will also be able to view your health information using other applications (apps) compatible with our system.

## 2022-04-23 NOTE — ED ADULT NURSE NOTE - OBJECTIVE STATEMENT
Pt AOx4, ambulatory, c/o "my asthma is getting worst since this morning". Pt able to talk in full sentences, no distress noted.

## 2022-05-05 ENCOUNTER — EMERGENCY (EMERGENCY)
Facility: HOSPITAL | Age: 32
LOS: 1 days | Discharge: HOME | End: 2022-05-05
Attending: EMERGENCY MEDICINE
Payer: MEDICAID

## 2022-05-05 VITALS
HEIGHT: 67 IN | OXYGEN SATURATION: 99 % | SYSTOLIC BLOOD PRESSURE: 128 MMHG | DIASTOLIC BLOOD PRESSURE: 87 MMHG | TEMPERATURE: 98 F | WEIGHT: 134.92 LBS | RESPIRATION RATE: 17 BRPM | HEART RATE: 80 BPM

## 2022-05-05 PROCEDURE — 99284 EMERGENCY DEPT VISIT MOD MDM: CPT

## 2022-05-05 RX ORDER — DEXAMETHASONE 0.5 MG/5ML
8 ELIXIR ORAL ONCE
Refills: 0 | Status: COMPLETED | OUTPATIENT
Start: 2022-05-05 | End: 2022-05-05

## 2022-05-05 RX ORDER — ALBUTEROL 90 UG/1
1 AEROSOL, METERED ORAL ONCE
Refills: 0 | Status: COMPLETED | OUTPATIENT
Start: 2022-05-05 | End: 2022-05-05

## 2022-05-05 RX ORDER — ALBUTEROL 90 UG/1
2.5 AEROSOL, METERED ORAL ONCE
Refills: 0 | Status: COMPLETED | OUTPATIENT
Start: 2022-05-05 | End: 2022-05-05

## 2022-05-06 VITALS
TEMPERATURE: 98 F | HEART RATE: 76 BPM | DIASTOLIC BLOOD PRESSURE: 76 MMHG | SYSTOLIC BLOOD PRESSURE: 124 MMHG | OXYGEN SATURATION: 98 % | RESPIRATION RATE: 18 BRPM

## 2022-05-06 PROCEDURE — 99283 EMERGENCY DEPT VISIT LOW MDM: CPT | Mod: 25

## 2022-05-06 PROCEDURE — 94640 AIRWAY INHALATION TREATMENT: CPT

## 2022-05-06 RX ADMIN — Medication 8 MILLIGRAM(S): at 00:06

## 2022-05-06 RX ADMIN — ALBUTEROL 2.5 MILLIGRAM(S): 90 AEROSOL, METERED ORAL at 00:06

## 2022-05-06 RX ADMIN — ALBUTEROL 1 PUFF(S): 90 AEROSOL, METERED ORAL at 00:06

## 2022-05-06 NOTE — ED ADULT NURSE NOTE - OBJECTIVE STATEMENT
32 yo male lying on bed c/o asthma exacerbation that started 2-3 days ago. As per patient, she ran out of inhaler. 30 yo male lying on bed c/o asthma exacerbation that started 2-3 days ago. As per patient, he ran out of inhaler.

## 2022-05-06 NOTE — ED ADULT NURSE NOTE - CAS ELECT INFOMATION PROVIDED
Patient refused to sign the discharge papers. Patient is AAOX4, ambulatory with steady gait./DC instructions

## 2022-05-06 NOTE — ED PROVIDER NOTE - PATIENT PORTAL LINK FT
You can access the FollowMyHealth Patient Portal offered by [facility] by registering at the following website: [email or web address]. By joining [email or web address]’s FollowMyHealth portal, you will also be able to view your health information using other applications (apps) compatible with our system.

## 2022-05-06 NOTE — ED PROVIDER NOTE - OBJECTIVE STATEMENT
31 year old male PMH asthma coming in with asthma exacerbation. states he ran out of his pump. denies all other complaints. undomiciled.

## 2022-05-06 NOTE — ED PROVIDER NOTE - NSFOLLOWUPINSTRUCTIONS_ED_ALL_ED_FT
[facility]                                                                                                                                                           Asthma Attack       Asthma attack, also called acute bronchospasm, is the sudden narrowing and tightening of the air passages, which limits the amount of oxygen that can get into the lungs. The narrowing is caused by inflammation and tightening of the muscles in the air tubes (bronchi) in the lungs.    Too much mucus is also produced, which narrows the airways more. This can cause trouble breathing, loud breathing (wheezing), and coughing. The goal of treatment is to open the airways in the lungs and reduce inflammation.      What are the causes?    Possible causes or triggers of this condition include:  •Animal dander, dust mites, or cockroaches.      •Mold, pollen from trees or grass, or cold air.      •Air pollutants such as dust, household , aerosol sprays, strong chemicals, strong odors, and smoke of any kind.      •Stress or strong emotions such as crying or laughing hard.      •Exercise or activity that requires a lot of energy.      •Substances in foods and drinks, such as dried fruits and wine, called sulfites.    •Certain medicines or medical conditions such as:  •Aspirin or beta-blockers.      •Infections or inflammatory conditions, such as a flu (influenza), a cold, pneumonia, or inflammation of the nasal membranes (rhinitis).      •Gastroesophageal reflux disease (GERD). GERD is a condition in which stomach acid backs up into your esophagus and spills into your trachea (windpipe), which can irritate your airways.          What are the signs or symptoms?    Symptoms of this condition include:  •Wheezing. This may sound like whistling while breathing. This may only happen at night.      •Excessive coughing. This may only happen at night.      •Chest tightness or pain.      •Shortness of breath.      •Feeling like you cannot get enough air no matter how hard you breathe (air hunger).        How is this diagnosed?    This condition may be diagnosed based on:  •Your medical history.      •Your symptoms.      •A physical exam.    •Tests to check for other causes of your symptoms or other conditions that may have triggered your asthma attack. These tests may include:  •A chest X-ray.      •Blood tests.      •Tests to assess lung function, such as breathing into a device that measures how much air you can inhale and exhale (spirometry).          How is this treated?    Treatment for this condition depends on the severity and cause of your asthma attack.•For mild attacks, you may receive medicines through a hand-held inhaler (metered dose inhaler, or MDI) or through a device that turns liquid medicine into a mist (nebulizer). These medicines include:  •Quick relief or rescue medicines that quickly relax the airways and lungs.      •Long-acting medicines that are used daily to prevent (control) your asthma symptoms.        •For moderate or severe attacks, you may be treated with steroid medicines by mouth or through an IV injection at the hospital.      •For severe attacks, you may need oxygen therapy or a breathing machine (ventilator).      •If your asthma attack was caused by an infection from bacteria, you will be given antibiotic medicines.        Follow these instructions at home:    Medicines   •Take over-the-counter and prescription medicines only as told by your health care provider.   •Keep your medicines up-to-date.       •Make sure you have all of your medicines available at all times.        •If you were prescribed an antibiotic medicine, take it as told by your health care provider. Do not stop taking the antibiotic even if you start to feel better.      •Tell your doctor if you may be pregnant to make sure your asthma medicine is safe to use during pregnancy.        Avoiding triggers      •Keep track of things that trigger your asthma attacks. Avoid exposure to these triggers.      • Do not use any products that contain nicotine or tobacco, such as cigarettes, e-cigarettes, and chewing tobacco. If you need help quitting, ask your health care provider.      •When there is a lot of pollen, air pollution, or humidity, keep windows closed and use an air conditioner or go to places with air conditioning.      Asthma action plan   •Work with your health care provider to make a written plan for managing and treating your asthma attacks (asthma action plan). This plan should include:  •A list of your asthma triggers and how to avoid them.      •A list of symptoms that you may have during an asthma attack.      •Information about which medicine to take, when to take the medicine and how much of the medicine to take.      •Information to help you understand your peak flow measurements.      •Daily actions that you can take to control your asthma symptoms.      •Contact information for your health care providers.        •If you have an asthma attack, act quickly. Follow the emergency steps on your written asthma action plan. This may prevent you from needing to go to the hospital.      General instructions     •Avoid excessive exercise or activity until your asthma attack goes away. Ask your health care provider what activities are safe for you and when you can return to your normal activities.      •Stay up to date on all your vaccines, such as flu and pneumonia vaccines.      •Drink enough fluid to keep your urine pale yellow. Staying hydrated helps keep mucus in your lungs thin so it can be coughed up easily.      • Do not use alcohol until you have recovered.      •Keep all follow-up visits as told by your health care provider. This is important. Asthma requires careful medical care.        Contact a health care provider if:    •You have followed your action plan for 1 hour and your peak flow reading is still at 50–79%. This is in the yellow zone, which means "caution."      •You need to use your quick reliever medicine more frequently than normal.      •Your medicines are causing side effects, such as rash, itching, swelling, or trouble breathing.      •Your symptoms do not improve after 48 hours.      •You cough up mucus that is thicker than usual.      •You have a fever.        Get help right away if:    •Your peak flow reading is less than 50% of your personal best. This is in the red zone, which means "danger."      •You have trouble breathing.      •You develop chest pain or discomfort.      •Your medicines no longer seem to be helping.      •You are coughing up bloody mucus.      •You have a fever and your symptoms suddenly get worse.      •You have trouble swallowing.      •You feel very tired, and breathing becomes tiring.      These symptoms may represent a serious problem that is an emergency. Do not wait to see if the symptoms will go away. Get medical help right away. Call your local emergency services (911 in the [place] Do not drive yourself to the hospital.       Summary    •Asthma attacks are caused by narrowing or tightness in air passages, which causes shortness of breath, coughing, and loud breathing (wheezing).      •Many things can trigger an asthma attack, such as allergens, weather changes, exercise, strong odors, and smoke of any kind.      •If you have an asthma attack, act quickly. Follow the emergency steps on your written asthma action plan.      •Get help right away if you have severe trouble breathing, chest pain, or fever, or if your home medicines are no longer helping with your symptoms.      This information is not intended to replace advice given to you by your health care provider. Make sure you discuss any questions you have with your health care provider.      Document Revised: 12/15/2020 Document Reviewed: 12/15/2020    Elsevier Patient Education © 2022 [employer]

## 2022-05-10 ENCOUNTER — EMERGENCY (EMERGENCY)
Facility: HOSPITAL | Age: 32
LOS: 1 days | Discharge: LEFT BEFORE DISCHARGE | End: 2022-05-10
Attending: EMERGENCY MEDICINE
Payer: MEDICAID

## 2022-05-10 VITALS
HEIGHT: 67 IN | TEMPERATURE: 99 F | OXYGEN SATURATION: 98 % | DIASTOLIC BLOOD PRESSURE: 76 MMHG | SYSTOLIC BLOOD PRESSURE: 119 MMHG | WEIGHT: 130.07 LBS | RESPIRATION RATE: 20 BRPM | HEART RATE: 77 BPM

## 2022-05-10 PROCEDURE — L9991: CPT

## 2022-05-10 RX ORDER — IPRATROPIUM/ALBUTEROL SULFATE 18-103MCG
3 AEROSOL WITH ADAPTER (GRAM) INHALATION ONCE
Refills: 0 | Status: DISCONTINUED | OUTPATIENT
Start: 2022-05-10 | End: 2022-05-14

## 2022-05-17 ENCOUNTER — EMERGENCY (EMERGENCY)
Facility: HOSPITAL | Age: 32
LOS: 1 days | Discharge: LEFT BEFORE DISCHARGE | End: 2022-05-17
Attending: EMERGENCY MEDICINE
Payer: MEDICAID

## 2022-05-17 VITALS
DIASTOLIC BLOOD PRESSURE: 74 MMHG | RESPIRATION RATE: 18 BRPM | HEIGHT: 67 IN | SYSTOLIC BLOOD PRESSURE: 131 MMHG | OXYGEN SATURATION: 96 % | HEART RATE: 88 BPM | TEMPERATURE: 98 F

## 2022-05-17 PROCEDURE — L9991: CPT

## 2022-05-17 NOTE — ED ADULT NURSE NOTE - OBJECTIVE STATEMENT
patient presents to ED requesting inhaler medication. Patient A&OX4, denies difficulty breathing, no chest pain. ambulated independently in no acute distress.

## 2022-05-17 NOTE — ED PROVIDER NOTE - CCCP TRG CHIEF CMPLNT
[facility]  The [address] [place] [id number]  Phone: [phone]               May 18, 2019    Patient: Tricia Burgess   YOB: 2000   Date of Visit: 5/18/2019       To Whom It May Concern:    Tricia Burgess was seen and treated in our emergency department on 5/18/2019. She may return to work on May 19, 2019.       Sincerely,       Marya Bhatt PA-C         Signature:__________________________________
needs meds

## 2022-05-23 ENCOUNTER — EMERGENCY (EMERGENCY)
Facility: HOSPITAL | Age: 32
LOS: 1 days | Discharge: HOME | End: 2022-05-23
Attending: EMERGENCY MEDICINE
Payer: MEDICAID

## 2022-05-23 VITALS
OXYGEN SATURATION: 98 % | HEART RATE: 81 BPM | HEIGHT: 67 IN | SYSTOLIC BLOOD PRESSURE: 122 MMHG | DIASTOLIC BLOOD PRESSURE: 74 MMHG | TEMPERATURE: 98 F | WEIGHT: 136.91 LBS | RESPIRATION RATE: 16 BRPM

## 2022-05-23 PROCEDURE — 94640 AIRWAY INHALATION TREATMENT: CPT

## 2022-05-23 PROCEDURE — 99283 EMERGENCY DEPT VISIT LOW MDM: CPT | Mod: 25

## 2022-05-23 PROCEDURE — 99284 EMERGENCY DEPT VISIT MOD MDM: CPT

## 2022-05-23 RX ORDER — FLUTICASONE PROPIONATE AND SALMETEROL 50; 250 UG/1; UG/1
1 POWDER ORAL; RESPIRATORY (INHALATION)
Qty: 1 | Refills: 0
Start: 2022-05-23 | End: 2022-06-21

## 2022-05-23 RX ORDER — ALBUTEROL 90 UG/1
2.5 AEROSOL, METERED ORAL ONCE
Refills: 0 | Status: COMPLETED | OUTPATIENT
Start: 2022-05-23 | End: 2022-05-23

## 2022-05-23 RX ADMIN — ALBUTEROL 2.5 MILLIGRAM(S): 90 AEROSOL, METERED ORAL at 10:24

## 2022-05-23 NOTE — ED PROVIDER NOTE - NSFOLLOWUPINSTRUCTIONS_ED_ALL_ED_FT
Asthma, Adult       Asthma is a long-term (chronic) condition that causes recurrent episodes in which the airways become tight and narrow. The airways are the passages that lead from the nose and mouth down into the lungs. Asthma episodes, also called asthma attacks, can cause coughing, wheezing, shortness of breath, and chest pain. The airways can also fill with mucus. During an attack, it can be difficult to breathe. Asthma attacks can range from minor to life threatening.    Asthma cannot be cured, but medicines and lifestyle changes can help control it and treat acute attacks.      What are the causes?    This condition is believed to be caused by inherited (genetic) and environmental factors, but its exact cause is not known.    There are many things that can bring on an asthma attack or make asthma symptoms worse (triggers). Asthma triggers are different for each person. Common triggers include:  •Mold.      •Dust.      •Cigarette smoke.      •Cockroaches.      •Things that can cause allergy symptoms (allergens), such as animal dander or pollen from trees or grass.      •Air pollutants such as household , wood smoke, smog, or chemical odors.      •Cold air, weather changes, and winds (which increase molds and pollen in the air).      •Strong emotional expressions such as crying or laughing hard.      •Stress.      •Certain medicines (such as aspirin) or types of medicines (such as beta-blockers).      •Sulfites in foods and drinks. Foods and drinks that may contain sulfites include dried fruit, potato chips, and sparkling grape juice.      •Infections or inflammatory conditions such as the flu, a cold, or inflammation of the nasal membranes (rhinitis).      •Gastroesophageal reflux disease (GERD).      •Exercise or strenuous activity.        What are the signs or symptoms?    Symptoms of this condition may occur right after asthma is triggered or many hours later. Symptoms include:  •Wheezing. This can sound like whistling when you breathe.      •Excessive nighttime or early morning coughing.      •Frequent or severe coughing with a common cold.      •Chest tightness.      •Shortness of breath.      •Tiredness (fatigue) with minimal activity.        How is this diagnosed?    This condition is diagnosed based on:  •Your medical history.      •A physical exam.    •Tests, which may include:  •Lung function studies and pulmonary studies (spirometry). These tests can evaluate the flow of air in your lungs.      •Allergy tests.      •Imaging tests, such as X-rays.          How is this treated?    There is no cure for this condition, but treatment can help control your symptoms. Treatment for asthma usually involves:  •Identifying and avoiding your asthma triggers.    •Using medicines to control your symptoms. Generally, two types of medicines are used to treat asthma:  •Controller medicines. These help prevent asthma symptoms from occurring. They are usually taken every day.      •Fast-acting reliever or rescue medicines. These quickly relieve asthma symptoms by widening the narrow and tight airways. They are used as needed and provide short-term relief.        •Using supplemental oxygen. This may be needed during a severe episode.    •Using other medicines, such as:  •Allergy medicines, such as antihistamines, if your asthma attacks are triggered by allergens.      •Immune medicines (immunomodulators). These are medicines that help control the immune system.      •Creating an asthma action plan. An asthma action plan is a written plan for managing and treating your asthma attacks. This plan includes:  •A list of your asthma triggers and how to avoid them.      •Information about when medicines should be taken and when their dosage should be changed.      •Instructions about using a device called a peak flow meter. A peak flow meter measures how well the lungs are working and the severity of your asthma. It helps you monitor your condition.          Follow these instructions at home:    Controlling your home environment     Control your home environment in the following ways to help avoid triggers and prevent asthma attacks:  •Change your heating and air conditioning filter regularly.      •Limit your use of fireplaces and wood stoves.      •Get rid of pests (such as roaches and mice) and their droppings.      •Throw away plants if you see mold on them.      •Clean floors and dust surfaces regularly. Use unscented cleaning products.      •Try to have someone else vacuum for you regularly. Stay out of rooms while they are being vacuumed and for a short while afterward. If you vacuum, use a dust mask from a hardware store, a double-layered or microfilter vacuum  bag, or a vacuum  with a HEPA filter.      •Replace carpet with wood, tile, or vinyl bernice. Carpet can trap dander and dust.      •Use allergy-proof pillows, mattress covers, and box spring covers.      •Keep your bedroom a trigger-free room.       •Avoid pets and keep windows closed when allergens are in the air.      •Wash beddings every week in hot water and dry them in a dryer.      •Use blankets that are made of polyester or cotton.      •Clean bathrooms and esperanza with bleach. If possible, have someone repaint the walls in these rooms with mold-resistant paint. Stay out of the rooms that are being cleaned and painted.      •Wash your hands often with soap and water. If soap and water are not available, use hand .      •Do not allow anyone to smoke in your home.      General instructions  •Take over-the-counter and prescription medicines only as told by your health care provider.  •Speak with your health care provider if you have questions about how or when to take the medicines.      •Make note if you are requiring more frequent dosages.        •Do not use any products that contain nicotine or tobacco, such as cigarettes and e-cigarettes. If you need help quitting, ask your health care provider. Also, avoid being exposed to secondhand smoke.      •Use a peak flow meter as told by your health care provider. Record and keep track of the readings.      •Understand and use the asthma action plan to help minimize, or stop an asthma attack, without needing to seek medical care.      •Make sure you stay up to date on your yearly vaccinations as told by your health care provider. This may include vaccines for the flu and pneumonia.      •Avoid outdoor activities when allergen counts are high and when air quality is low.      •Wear a ski mask that covers your nose and mouth during outdoor winter activities. Exercise indoors on cold days if you can.      •Warm up before exercising, and take time for a cool-down period after exercise.      •Keep all follow-up visits as told by your health care provider. This is important.        Where to find more information    •For information about asthma, turn to the Centers for Disease Control and Prevention at www.cdc.gov/asthma/faqs      •For air quality information, turn to [employer]Now at airnow.gov        Contact a health care provider if:    •You have wheezing, shortness of breath, or a cough even while you are taking medicine to prevent attacks.      •The mucus you cough up (sputum) is thicker than usual.      •Your sputum changes from clear or white to yellow, green, gray, or bloody.      •Your medicines are causing side effects, such as a rash, itching, swelling, or trouble breathing.      •You need to use a reliever medicine more than 2–3 times a week.      •Your peak flow reading is still at 50–79% of your personal best after following your action plan for 1 hour.      •You have a fever.        Get help right away if:    •You are getting worse and do not respond to treatment during an asthma attack.      •You are short of breath when at rest or when doing very little physical activity.      •You have difficulty eating, drinking, or talking.      •You have chest pain or tightness.      •You develop a fast heartbeat or palpitations.      •You have a bluish color to your lips or fingernails.      •You are light-headed or dizzy, or you faint.      •Your peak flow reading is less than 50% of your personal best.      •You feel too tired to breathe normally.        Summary    •Asthma is a long-term (chronic) condition that causes recurrent episodes in which the airways become tight and narrow. These episodes can cause coughing, wheezing, shortness of breath, and chest pain.      •Asthma cannot be cured, but medicines and lifestyle changes can help control it and treat acute attacks.      •Make sure you understand how to avoid triggers and how and when to use your medicines.      •Asthma attacks can range from minor to life threatening. Get help right away if you have an asthma attack and do not respond to treatment with your usual rescue medicines.      This information is not intended to replace advice given to you by your health care provider. Make sure you discuss any questions you have with your health care provider.

## 2022-05-23 NOTE — ED PROVIDER NOTE - OBJECTIVE STATEMENT
pt with hs of asthma with many ed visits  no hx of intubations  non smoker  pt taking albuterol at home and is on prednisone

## 2022-05-23 NOTE — ED PROVIDER NOTE - PATIENT PORTAL LINK FT
You can access the FollowMyHealth Patient Portal offered by [facility] by registering at the following website: [email or web address]. By joining [facility]’s FollowMyHealth portal, you will also be able to view your health information using other applications (apps) compatible with our system.

## 2022-05-23 NOTE — ED PROVIDER NOTE - CLINICAL SUMMARY MEDICAL DECISION MAKING FREE TEXT BOX
asthma exacerbation    speaking in full sentences  will give treatment and send advair rx to pharmacy

## 2022-05-25 NOTE — ED PROVIDER NOTE - TEMPLATE
Respiratory Modified Advancement Flap Text: The defect edges were debeveled with a #15 scalpel blade.  Given the location of the defect, shape of the defect and the proximity to free margins a modified advancement flap was deemed most appropriate.  Using a sterile surgical marker, an appropriate advancement flap was drawn incorporating the defect and placing the expected incisions within the relaxed skin tension lines where possible.    The area thus outlined was incised deep to adipose tissue with a #15 scalpel blade.  The skin margins were undermined to an appropriate distance in all directions utilizing iris scissors.

## 2022-06-13 NOTE — ED ADULT NURSE NOTE - NURSING ED SKIN COLOR
-- DO NOT REPLY / DO NOT REPLY ALL --  -- Message is from the [facility]--    General Patient Message      Reason for Call: Patient is calling as she is looking to have the doctor contact her back at [phone], stating it is very important.  Patient declined to add any additional informtion to the message.     Caller Information       Type Contact Phone    06/13/2022 02:42 PM CDT Phone (Incoming) Maris Pabon (Self) [phone] (H)          Alternative phone number: n/a    Turnaround time given to caller:   \"This message will be sent to [state Provider's name]. The clinical team will fulfill your request as soon as they review your message.\"     normal for race

## 2022-06-15 NOTE — ED ADULT TRIAGE NOTE - LOCATION:
From: Lida Mims  To: Dr. Fisher Speckin/15/2022 4:08 PM EDT  Subject: Refill    May I have a refill on tramadol please/thank you!! Right arm;

## 2022-08-22 NOTE — ED ADULT TRIAGE NOTE - MODE OF ARRIVAL
Post-Care Instructions: I reviewed with the patient in detail post-care instructions. Patient is to wear sunprotection, and avoid picking at any of the treated lesions. Pt may apply Vaseline to crusted or scabbing areas.
Consent: The patient's consent was obtained including but not limited to risks of crusting, scabbing, blistering, scarring, darker or lighter pigmentary change, recurrence, incomplete removal and infection.
Walk in
Number Of Freeze-Thaw Cycles: 2 freeze-thaw cycles
Duration Of Freeze Thaw-Cycle (Seconds): 5
Total Number Of Aks Treated: 2
Render Post-Care Instructions In Note?: no
Detail Level: Zone

## 2022-11-14 NOTE — ED ADULT TRIAGE NOTE - PATIENT ON (OXYGEN DELIVERY METHOD)
Medication:   Requested Prescriptions     Pending Prescriptions Disp Refills    levothyroxine (SYNTHROID) 75 MCG tablet [Pharmacy Med Name: LEVOTHYROXINE 0.075MG (75MCG) TABS] 90 tablet 0     Sig: TAKE 3 TABLETS BY MOUTH DAILY       Last Filled:  [id number]    Patient Phone Number: [phone] (home)     Last appt: 9/26/2022   Next appt: 12/28/2022    Last Thyroid:   Lab Results   Component Value Date/Time    TSH 1.04 11/09/2022 11:29 AM    T4FREE 1.0 08/29/2022 03:27 PM
room air

## 2023-03-09 NOTE — ED ADULT TRIAGE NOTE - MEANS OF ARRIVAL
What Type Of Note Output Would You Prefer (Optional)?: Standard Output
What Is The Reason For Today's Visit?: Full Body Skin Examination
What Is The Reason For Today's Visit? (Being Monitored For X): the development of new lesions
How Severe Are Your Spot(S)?: moderate
Additional History: \\n\\n Pt is here for a TBE- denies any spots of concern
ambulatory

## 2023-03-20 NOTE — ED ADULT NURSE NOTE - CINV DISCH MEDS REVIEWED YN
What Type Of Note Output Would You Prefer (Optional)?: Bullet Format Hpi Title: Evaluation of Skin Lesions Yes

## 2023-06-19 NOTE — ED ADULT TRIAGE NOTE - ACCOMPANIED BY
John Horner is here today for No chief complaint on file.    Concerns/symptoms: leg infection? Right shin for three weeks    Medications: medications verified and updated  Refills needed today? No     Tobacco history: verified     Advanced Directives: No not on file, not interested.    BP >140/90? Yes, Informed Provider, of increased BP & Need for Recheck    Care Teams Updated? Yes    Patient Preference for result Communication via:     Cell Phone:   Telephone Information:   Mobile [phone]     Okay to leave a message containing results? Yes    Health Maintenance Due   Topic Date Due   • DTaP/Tdap/Td Vaccine (1 - Tdap) Never done   • Shingles Vaccine (1 of 2) Never done   • DM/CKD GFR  07/18/2023      Patient is due for topics as listed above but is not proceeding with Immunization(s) Dtap/Tdap/Td and Shingles and Glomerular Filtration Rate (GFR) at this time.     Immunization History   Administered Date(s) Administered   • Tetanus 02/15/2013         PHQ 2:  Date Adult PHQ 2 Score Adult PHQ 2 Interpretation   2/27/2023 2 No further screening needed       PHQ 9:          Self

## 2023-08-29 NOTE — ED PROVIDER NOTE - OBJECTIVE STATEMENT
Patient presents via EMS from independent living for evaluation of an unwitnessed fall. Pain to head. Unknown baseline, patient is unsure of year and event. C-collar in place from EMS. 30M presenting with shortness of breath. feels that this is an asthma exacerbation and needs an inhaler. no fever, chest pain, nausea, vomiting, abdominal pain.

## 2023-08-31 NOTE — ED PROVIDER NOTE - NS ED ATTENDING STATEMENT MOD
Xray Chest 1 View- PORTABLE-Routine I have personally performed a face to face diagnostic evaluation on this patient. I have reviewed the ACP note and agree with the history, exam and plan of care, except as noted.

## 2023-10-09 NOTE — ED ADULT NURSE NOTE - NS ED NURSE DISCH DISPOSITION
Discharged
Displacement of other gastrointestinal prosthetic devices, implants and grafts, initial encounter

## 2023-11-21 NOTE — ED ADULT NURSE NOTE - NEURO GAIT
steady Follow-up with your OBGYN within the next week.    Ovarian Cyst    An ovarian cyst is a fluid-filled sac that forms on an ovary. The ovaries are small organs that produce eggs in women. Various types of cysts can form on the ovaries. Some may cause symptoms and require treatment. Most ovarian cysts go away on their own, are not cancerous (are benign), and do not cause problems.    What are the causes?  Ovarian cysts may be caused by:  Ovarian hyperstimulation syndrome. This is a condition that can develop from taking fertility medicines. It causes multiple large ovarian cysts to form.  Polycystic ovarian syndrome (PCOS). This is a common hormonal disorder that can cause ovarian cysts to form, and can cause problems with your period or fertility.  The normal menstrual cycle.  What increases the risk?  The following factors may make you more likely to develop this condition:  Being overweight or obese.  Taking fertility medicines.  Taking certain forms of hormonal birth control.  Smoking.  What are the signs or symptoms?  Many ovarian cysts do not cause symptoms. If symptoms are present, they may include:  Pelvic pain or pressure.  Pain in the lower abdomen.  Pain during sex.  Abdominal swelling.  Abnormal menstrual periods.  Increasing pain with menstrual periods.  How is this diagnosed?  These cysts are commonly found during a routine pelvic exam. You may have tests to find out more about the cyst, such as:  Ultrasound.  CT scan.  MRI.  Blood tests.  How is this treated?  Many ovarian cysts go away on their own without treatment. Your health care provider may want to check your cyst regularly for 2–3 months to see if it changes. If you are in menopause, it is especially important to have your cyst monitored closely because menopausal women have a higher rate of ovarian cancer.    When treatment is needed, it may include:  Medicines to help relieve pain.  A procedure to drain the cyst (aspiration).  Surgery to remove the whole cyst (cystectomy).  Hormone treatment or birth control pills. These methods are sometimes used to help keep cysts from coming back.  Surgery to remove the ovary (oophorectomy).  Follow these instructions at home:  Take over-the-counter and prescription medicines only as told by your health care provider.  Ask your health care provider if any medicine prescribed to you requires you to avoid driving or using machinery.  Get regular pelvic exams and Pap tests as often as told by your health care provider.  Return to your normal activities as told by your health care provider. Ask your health care provider what activities are safe for you.  Do not use any products that contain nicotine or tobacco, such as cigarettes, e-cigarettes, and chewing tobacco. If you need help quitting, ask your health care provider.  Keep all follow-up visits. This is important.  Contact a health care provider if:  Your periods are late, irregular, painful, or they stop.  You have pelvic pain that does not go away.  You have pressure on your bladder or trouble emptying your bladder completely.  You have any of the following:  A feeling of fullness.  You are gaining weight or losing weight without changing your exercise and eating habits.  Pain, swelling, or bloating in the abdomen.  Loss of appetite.  Pain and pressure in your back and pelvis.  You think you may be pregnant.  Get help right away if:  You have abdominal or pelvic pain that is severe or gets worse.  You cannot eat or drink without vomiting.  You suddenly develop a fever or chills.  Your menstrual period is much heavier than usual.  Summary  An ovarian cyst is a fluid-filled sac that forms on an ovary.  Some ovarian cysts may cause symptoms and require treatment.  These cysts are commonly found during a routine pelvic exam.  Many ovarian cysts go away on their own without treatment.

## 2024-02-13 NOTE — ED PROVIDER NOTE - HIV OFFER
----- Message from Maximo Penaloza MD sent at 2/13/2024  2:05 PM CST -----  Please call her and tell her that her placenta is low lying. Therefore, pelvic rest (no sex) is recommended until follow up ultrasound. We will repeat the US in 4 weeks. If she has any questions, let me know and I'll call her.    Previously Declined (within the last year)

## 2024-04-27 NOTE — ED PROVIDER NOTE - OBJECTIVE STATEMENT
Patient with a history of chronic daily alcohol use  Last drink 4/26 around 1100  Serum alcohol 362 in the ED (4/26, 1301)  Initiate SEWS protocol for medical management of alcohol withdrawal  Current alcohol withdrawal signs/symptoms include anxiety, tremor, HTN  SEWS score 8 upon admission  Administer 260 mg of phenobarbital as initial dose  Continue monitoring under protocol and administer phenobarbital as indicated  Continuous pulse ox and telemetry monitoring    31 year-old male, history of asthma, never intubated, presents for albuterol refill. Reports that has been intermittent wheezing sensation associated with SOB. Reports using Albuterol inhaler every 2-3 days. Today ran out of Albuterol. Denies any symptoms at this time. Doesn't have a pulmonologist or PMD to follow up with. Denies any fever, chills, chest pain, dizziness, sore throat, rash or any other complaints.

## 2024-05-02 NOTE — ED ADULT TRIAGE NOTE - HEIGHT IN CM
[de-identified] : Sutures were removed.  I recommend Epson salt and warm water soaks with flexion and extension exercises and scar massage.  Patient understands numbness or tingling can persist for up to 6 months. Any residual numbness and tingling after 6 months is permanent.  She will contact the office with any problems. [phone]

## 2024-05-20 NOTE — ED ADULT NURSE NOTE - CHEST APPEARANCE
Spoke with pt she stated that her bp is elevated pt stated that she took her bp this morning it was 154/101 then she took it a few moment later it was 149/108 pt is taking lisinopril pt stated the she has a little dizziness and sob but she does not feel that she needs to go to ER and would like to know what Dr Felton suggest    normal

## 2024-06-11 NOTE — ED PROVIDER NOTE - QUALITY
alteration in breathing pattern
Detail Level: Zone
Photo Preface (Leave Blank If You Do Not Want): Photographs were obtained today

## 2024-07-20 NOTE — ED ADULT NURSE NOTE - NS ED NURSE LEVEL OF CONSCIOUSNESS ORIENTATION
[facility]    Rosario Benitez Patient Status:  Inpatient   Age/Gender 65 year old female MRN [id number]   Location [facility] SURGERY Attending Maximiliano Birch MD   Hosp Day # 2 PCP Petar Campbell MD       Anesthesia Post-op Note    Radical abbdominal hysterectomy with bilateral salpingo-oophorectomies and partial vaginectomy. Pelvic lymphadenectomy. Bilateral ureterolysis.    Procedure Summary       Date: 07/20/24 Room / Location:  MAIN OR 08 / [facility] MAIN OR    Anesthesia Start: 0755 Anesthesia Stop: 1043    Procedure: Radical abbdominal hysterectomy with bilateral salpingo-oophorectomies and partial vaginectomy. Pelvic lymphadenectomy. Bilateral ureterolysis. Diagnosis:       Abdominal pain      (Abdominal pain [R10.9])    Surgeons: Tex Chavarria MD Anesthesiologist: Darryl Aguilera MD    Anesthesia Type: general ASA Status: 2            Anesthesia Type: No value filed.    Vitals Value Taken Time   /70 07/20/24 1050   Temp 98.5 07/20/24 1050   Pulse 85 07/20/24 1050   Resp 12 07/20/24 1050   SpO2 98 07/20/24 1050       Patient Location: PACU    Anesthesia Type: general    Airway Patency: patent    Postop Pain Control: adequate    Mental Status: preanesthetic baseline    Nausea/Vomiting: none    Cardiopulmonary/Hydration status: stable euvolemic    Complications: no apparent anesthesia related complications    Postop vital signs: stable    Dental Exam: Unchanged from Preop    Patient to be discharged from PACU when criteria met.           Oriented - self; Oriented - place; Oriented - time

## 2024-12-18 NOTE — ED ADULT NURSE NOTE - NS ED NURSE RECORD ANOTHER HT AND WT
Subjective   Patient interviewed and examined at bedside with mother. Patient is well-appearing and in no acute distress. Pain controlled. Patient states that she feels that the strength in her lower extremities is improving and that her sensation is almost back to baseline in bilateral lower extremities. She is excited for discharge to rehab today.       Objective  Vital Signs Last 24 Hrs  T(C): 36.8 (12-18-24 @ 09:30), Max: 37.2 (12-18-24 @ 02:18)  T(F): 98.2 (12-18-24 @ 09:30), Max: 98.9 (12-18-24 @ 02:18)  HR: 88 (12-18-24 @ 09:30) (76 - 111)  BP: 101/69 (12-18-24 @ 09:30) (91/56 - 115/78)  BP(mean): --  RR: 18 (12-18-24 @ 09:30) (18 - 20)  SpO2: 100% (12-18-24 @ 09:30) (97% - 100%)    PHYSICAL EXAM:  General: Patient in no acute distress, able to answer questions and follow commands appropriately  Respiratory: Good respiratory effort  Spine:   HV drain with serosanguinous output  Dressings was removed- midline incision is healing well. Hemovac drain removed, tolerated well. New aquacel placed. Tegaderm and guaze placed over drain site.     MOTOR EXAM:                         Elbow Flex (C5)     Wrist Ext (C6)     Elbow Ext (C7)      Finger Flex (C8)    Finger Abduction (T1)  RIGHT                 4+/5                         4+/5                         4+/5                          4+/5                              4+/5  LEFT                   4+/5                       4+/5                       4+/5                       4+/5                            4+/5                           Hip Flex (L2)      Knee Ext (L3)      Ank Dorsiflex (L4)     Hallux Ext (L5)     Ank PlantarFlex (S1)  RIGHT               3+/5                      4+/5                          0/5                            0/5                           0/5  LEFT                  3+/5                      4+/5                          0/5                            0/5                           0/5        SENSORY EXAM:                        C5      C6      C7      C8       T1          RIGHT          2         2        2         2         2          (0=absent, 1=impaired, 2=normal, NT=not testable)  LEFT             2         2        2         2         2          (0=absent, 1=impaired, 2=normal, NT=not testable)                        L2        L3       L4      L5       S1          RIGHT        2          2         2        1        1           (0=absent, 1=impaired, 2=normal, NT=not testable)  LEFT           2          2         1        1        2           (0=absent, 1=impaired, 2=normal, NT=not testable)        ASSESSMENT:  17F status-post T2-L4 PSF with Rib Resections with PRS Closure, on 12/6/24; now status-post Revision T12-S1 Posterior Spinal Fusion on 12/11/24 after advanced imaging demonstrated vertebral body fractures and bilateral pedicle fractures at the L4 level with medialization of the Right L4 pedicle screw into the spinal canal.    PLAN:  - Analgesia as needed  - WBAT with assistance and supervision at all times.   - Repeat RVP negative cleared for rehab   - PT/OT: Mobilization and OOBTC as able.   - Drain removed, incisional management per plastic surgery   - DVT PPX: VCDs.   - Incentive Spirometry encouraged.   - Regular diet as tolerated.   - Bowel regimen.   - Dispo: rehab 12/18  Yes

## 2025-04-29 NOTE — ED ADULT TRIAGE NOTE - PRO INTERPRETER NEED 2
Medication refill approved per refill protocol.   
"Last seen: 4/15/2025  RTC: 4-6 weeks   Any patient initiated cancellations or no shows since last visit? No  Next appt: 5/13/2025  Last filled: 4/7/2025       Incoming refill from fax by pharmacy    Medication requested:   Pending Prescriptions:                       Disp   Refills    traZODone (DESYREL) 50 MG tablet          30 tab*1            Sig: Take 1 tablet (50 mg) by mouth at bedtime.      From chart note: \"continue trazodone 50 mg PO Q Day \"    Is note signed/closed? Yes    Is this a 90 day request for a psych medication? No    LPNs - Please check  if controlled and send to provider  Others - Send to RNs       "
English

## 2025-06-05 NOTE — ED PROVIDER NOTE - WET READ LAUNCH FT
Please call with results. Help schedule follow up in 3 months for diabetes.     Willian,    Your labs are back. Your A1C was 7.3 - a bit worse than when we last checked it, but still okay. I think the higher dose of ozempic should help with this. Your cholesterol looks much worse than last time. I want you to take a higher dose of the atorvastatin (40 mg) and I sent this to your pharmacy ([facility]). You had a bit of protein in your urine which indicates damage to your kidneys from the diabetes - so it is important that you come back to clinic in about 3 months for a recheck with labs to make sure we are staying on top of things.    If you have any questions or concerns, please let me know.    Dr. Perez   There are no Wet Read(s) to document.

## 2025-06-16 NOTE — ED ADULT NURSE NOTE - CAS TRG GEN SKIN COLOR
Physical Exam     Patient Name: Fuentes Vanessa  : 1959   MRN: [id number]   Care Team: Patient Care Team:  Clarisse Sanderson DO as PCP - General (Internal Medicine)  Provider, No Known as PCP - Family Medicine    Chief Complaint:    Chief Complaint   Patient presents with    Annual Exam       History of Present Illness: Fuentes Vanessa is a 66 y.o. male with HTN, prediabetes, HLD, ADHD, GERD, sleep difficulty  who is presents today for annual healthcare maintenance.     Last seen about 2 weeks ago for uncontrolled HTN despite compliance with lisinopril 40mg daily. He has since held his vyvanse and BP is normal now. Feeling well.    No acute complaints.      Health Maintenance   Topic Date Due    TDAP/TD VACCINES (2 - Td or Tdap) 2024    COVID-19 Vaccine (3 - - season) 2024    LIPID PANEL  2025    INFLUENZA VACCINE  2025    ANNUAL PHYSICAL  2026    COLORECTAL CANCER SCREENING  10/30/2028    HEPATITIS C SCREENING  Completed    Pneumococcal Vaccine 50+  Completed    ZOSTER VACCINE  Completed       Subjective      Review of Systems:   Review of Systems - See HPI    Past Medical History:   Past Medical History:   Diagnosis Date    ADHD (attention deficit hyperactivity disorder) 5 years    Allergic 1965    Penicillin, pollen, dust,    Anxiety     Arthritis     Cataract 2018    replaced    GERD (gastroesophageal reflux disease) 2006    Hypertension 2002    Visual impairment     Chronic dry eye       Past Surgical History:   Past Surgical History:   Procedure Laterality Date    JOINT REPLACEMENT  , ,2018       Family History:   Family History   Problem Relation Age of Onset    Cancer Father         Lung, Brain    Heart disease Father         Bypass surgery    Stroke Father         Smoker    Hyperlipidemia Father     Hypertension Father     Stroke Maternal Grandfather         Smoker    COPD Paternal Grandfather         Emphysema, Brake dust, smoking    Diabetes  "Brother     Stroke Paternal Aunt         Smoker    Hyperlipidemia Mother        Social History:   Social History     Socioeconomic History    Marital status:    Tobacco Use    Smoking status: Never    Smokeless tobacco: Never   Vaping Use    Vaping status: Never Used   Substance and Sexual Activity    Alcohol use: Yes     Alcohol/week: 3.0 - 9.0 standard drinks of alcohol     Types: 2 - 6 Cans of beer, 1 - 3 Drinks containing 0.5 oz of alcohol per week     Comment: Socially    Drug use: Never    Sexual activity: Yes     Partners: Female       Tobacco History:   Social History     Tobacco Use   Smoking Status Never   Smokeless Tobacco Never       Medications:     Current Outpatient Medications:     esomeprazole (nexIUM) 40 MG capsule, Take 1 capsule by mouth Every Morning Before Breakfast., Disp: 90 capsule, Rfl: 3    lisdexamfetamine (VYVANSE) 70 MG capsule, Take 1 capsule by mouth Every Morning, Disp: , Rfl:     lisinopril (PRINIVIL,ZESTRIL) 40 MG tablet, Take 1 tablet by mouth Daily., Disp: 90 tablet, Rfl: 3    rosuvastatin (CRESTOR) 40 MG tablet, Take 1 tablet by mouth Daily., Disp: 90 tablet, Rfl: 3    tamsulosin (FLOMAX) 0.4 MG capsule 24 hr capsule, Take 2 capsules by mouth Daily., Disp: 180 capsule, Rfl: 3    traZODone (DESYREL) 100 MG tablet, Take 1 tablet by mouth every night at bedtime., Disp: 30 tablet, Rfl: 5    Allergies:   Allergies   Allergen Reactions    Penicillin G Unknown - High Severity       Past Medical History, Social History, Family History and Care Team were all reviewed with patient and updated as appropriate.       Objective     Physical Exam:  Vital Signs:   Vitals:    06/16/25 1020   BP: 118/80   Pulse: 69   SpO2: 97%   Weight: 86.8 kg (191 lb 6.4 oz)   Height: 172.7 cm (68\")     Body mass index is 29.1 kg/m².     Physical Exam  Vitals reviewed.   Constitutional:       Appearance: Normal appearance. He is not ill-appearing.   Cardiovascular:      Rate and Rhythm: Normal rate and " regular rhythm.      Pulses: Normal pulses.      Heart sounds: Normal heart sounds. No murmur heard.  Pulmonary:      Effort: Pulmonary effort is normal. No respiratory distress.      Breath sounds: Normal breath sounds. No wheezing.   Abdominal:      General: Abdomen is flat. There is no distension.      Palpations: Abdomen is soft.      Tenderness: There is no abdominal tenderness.   Skin:     General: Skin is warm and dry.   Neurological:      Mental Status: He is alert.   Psychiatric:         Mood and Affect: Mood normal.         Behavior: Behavior normal.         Judgment: Judgment normal.         Assessment / Plan      Assessment/Plan:   Problems Addressed This Visit  Diagnoses and all orders for this visit:    1. Annual physical exam (Primary)  -     CBC (No Diff); Future  -     Lipid Panel; Future  -     Hemoglobin A1c; Future  -     Comprehensive Metabolic Panel; Future  -     TSH; Future  -     PSA Screen; Future    2. Attention deficit hyperactivity disorder (ADHD), unspecified ADHD type    3. Primary hypertension  -     CBC (No Diff); Future  -     Comprehensive Metabolic Panel; Future  -     TSH; Future  -     lisinopril (PRINIVIL,ZESTRIL) 40 MG tablet; Take 1 tablet by mouth Daily.  Dispense: 90 tablet; Refill: 3    4. Prediabetes  -     Hemoglobin A1c; Future    5. Mixed hyperlipidemia  -     Lipid Panel; Future  -     rosuvastatin (CRESTOR) 40 MG tablet; Take 1 tablet by mouth Daily.  Dispense: 90 tablet; Refill: 3    6. Benign prostatic hyperplasia with lower urinary tract symptoms, symptom details unspecified    7. Encounter for screening prostate specific antigen (PSA) measurement  -     PSA Screen; Future    8. Immunization due  -     Cancel: Tdap Vaccine => 8yo IM (BOOSTRIX/ADACEL)    9. Sleeping difficulty  -     traZODone (DESYREL) 100 MG tablet; Take 1 tablet by mouth every night at bedtime.  Dispense: 30 tablet; Refill: 5    10. Dyspepsia  -     esomeprazole (nexIUM) 40 MG capsule; Take 1  capsule by mouth Every Morning Before Breakfast.  Dispense: 90 capsule; Refill: 3        Healthcare Maintenance   Vaccines:  Encouraged covid booster   Tdap - agreeable today but unintentionally left before receiving.     Cancer Screening  -Colon cancer screening colonoscopy 10/30/2023 - repeat in 2028  -PSA today    Other  -PHQ score 0  -Counseled on importance of maintaining healthy diet and routine exercise. Encouraged 150 min exercise per week.  -Tobacco cessation: not indicated, nonsmoker  -Blood pressure is at goal <130/80  -Metabolic screening today    Encouraged routine eye and dental exams.     BPH with LUTS - improved with flomax    HTN - well controlled with Lisinopril 40mg     Insomnia - well controlled with trazodone    HLD - well controleld with rosuvastatin 40mg daily     GERD - well controlled with PPI    ADHD - previously taking vyvanse but this contributed to uncontrolled HTN and so this is held. He will reevaluate his needs in August when he returns to work. He plans to decrease dosage but if HTN persists, may need to add additional antihypertensive until he retires in 2026 at which time he plans to d/c stimulant therapy.         Plan of care reviewed with patient at the conclusion of today's visit. Education was provided regarding diagnosis and management.  Patient verbalizes understanding of and agreement with management plan.    Follow Up:   Return in about 6 months (around 12/16/2025) for Recheck.        DO RICHARD Chandra RD  [facility]  [address]  [facility] [phone]  Fax [phone]  Phone [phone]       Normal for race